# Patient Record
Sex: FEMALE | Race: WHITE | NOT HISPANIC OR LATINO | URBAN - METROPOLITAN AREA
[De-identification: names, ages, dates, MRNs, and addresses within clinical notes are randomized per-mention and may not be internally consistent; named-entity substitution may affect disease eponyms.]

---

## 2019-07-12 PROBLEM — Z00.00 ENCOUNTER FOR PREVENTIVE HEALTH EXAMINATION: Status: ACTIVE | Noted: 2019-07-12

## 2019-08-03 ENCOUNTER — EMERGENCY (EMERGENCY)
Facility: HOSPITAL | Age: 29
LOS: 0 days | Discharge: HOME | End: 2019-08-03
Attending: EMERGENCY MEDICINE | Admitting: EMERGENCY MEDICINE
Payer: COMMERCIAL

## 2019-08-03 VITALS
TEMPERATURE: 98 F | SYSTOLIC BLOOD PRESSURE: 112 MMHG | HEART RATE: 83 BPM | OXYGEN SATURATION: 100 % | RESPIRATION RATE: 19 BRPM | DIASTOLIC BLOOD PRESSURE: 72 MMHG

## 2019-08-03 VITALS
OXYGEN SATURATION: 100 % | HEART RATE: 115 BPM | TEMPERATURE: 99 F | RESPIRATION RATE: 20 BRPM | DIASTOLIC BLOOD PRESSURE: 81 MMHG | SYSTOLIC BLOOD PRESSURE: 125 MMHG

## 2019-08-03 DIAGNOSIS — R07.9 CHEST PAIN, UNSPECIFIED: ICD-10-CM

## 2019-08-03 DIAGNOSIS — R07.89 OTHER CHEST PAIN: ICD-10-CM

## 2019-08-03 LAB
ALBUMIN SERPL ELPH-MCNC: 4.4 G/DL — SIGNIFICANT CHANGE UP (ref 3.5–5.2)
ALP SERPL-CCNC: 41 U/L — SIGNIFICANT CHANGE UP (ref 30–115)
ALT FLD-CCNC: 15 U/L — SIGNIFICANT CHANGE UP (ref 0–41)
ANION GAP SERPL CALC-SCNC: 10 MMOL/L — SIGNIFICANT CHANGE UP (ref 7–14)
AST SERPL-CCNC: 15 U/L — SIGNIFICANT CHANGE UP (ref 0–41)
BILIRUB SERPL-MCNC: 0.5 MG/DL — SIGNIFICANT CHANGE UP (ref 0.2–1.2)
BUN SERPL-MCNC: 8 MG/DL — LOW (ref 10–20)
CALCIUM SERPL-MCNC: 9.5 MG/DL — SIGNIFICANT CHANGE UP (ref 8.5–10.1)
CHLORIDE SERPL-SCNC: 104 MMOL/L — SIGNIFICANT CHANGE UP (ref 98–110)
CO2 SERPL-SCNC: 26 MMOL/L — SIGNIFICANT CHANGE UP (ref 17–32)
CREAT SERPL-MCNC: 0.8 MG/DL — SIGNIFICANT CHANGE UP (ref 0.7–1.5)
D DIMER BLD IA.RAPID-MCNC: 141 NG/ML DDU — SIGNIFICANT CHANGE UP (ref 0–230)
GLUCOSE SERPL-MCNC: 84 MG/DL — SIGNIFICANT CHANGE UP (ref 70–99)
HCT VFR BLD CALC: 38.4 % — SIGNIFICANT CHANGE UP (ref 37–47)
HGB BLD-MCNC: 13 G/DL — SIGNIFICANT CHANGE UP (ref 12–16)
MCHC RBC-ENTMCNC: 30.3 PG — SIGNIFICANT CHANGE UP (ref 27–31)
MCHC RBC-ENTMCNC: 33.9 G/DL — SIGNIFICANT CHANGE UP (ref 32–37)
MCV RBC AUTO: 89.5 FL — SIGNIFICANT CHANGE UP (ref 81–99)
NRBC # BLD: 0 /100 WBCS — SIGNIFICANT CHANGE UP (ref 0–0)
PLATELET # BLD AUTO: 291 K/UL — SIGNIFICANT CHANGE UP (ref 130–400)
POTASSIUM SERPL-MCNC: 4.6 MMOL/L — SIGNIFICANT CHANGE UP (ref 3.5–5)
POTASSIUM SERPL-SCNC: 4.6 MMOL/L — SIGNIFICANT CHANGE UP (ref 3.5–5)
PROT SERPL-MCNC: 7.5 G/DL — SIGNIFICANT CHANGE UP (ref 6–8)
RBC # BLD: 4.29 M/UL — SIGNIFICANT CHANGE UP (ref 4.2–5.4)
RBC # FLD: 12.1 % — SIGNIFICANT CHANGE UP (ref 11.5–14.5)
SODIUM SERPL-SCNC: 140 MMOL/L — SIGNIFICANT CHANGE UP (ref 135–146)
TROPONIN T SERPL-MCNC: <0.01 NG/ML — SIGNIFICANT CHANGE UP
WBC # BLD: 4.87 K/UL — SIGNIFICANT CHANGE UP (ref 4.8–10.8)
WBC # FLD AUTO: 4.87 K/UL — SIGNIFICANT CHANGE UP (ref 4.8–10.8)

## 2019-08-03 PROCEDURE — 93010 ELECTROCARDIOGRAM REPORT: CPT

## 2019-08-03 PROCEDURE — 71046 X-RAY EXAM CHEST 2 VIEWS: CPT | Mod: 26

## 2019-08-03 PROCEDURE — 99285 EMERGENCY DEPT VISIT HI MDM: CPT

## 2019-08-03 RX ORDER — FAMOTIDINE 10 MG/ML
20 INJECTION INTRAVENOUS ONCE
Refills: 0 | Status: COMPLETED | OUTPATIENT
Start: 2019-08-03 | End: 2019-08-03

## 2019-08-03 RX ORDER — LIDOCAINE 4 G/100G
10 CREAM TOPICAL ONCE
Refills: 0 | Status: COMPLETED | OUTPATIENT
Start: 2019-08-03 | End: 2019-08-03

## 2019-08-03 RX ADMIN — LIDOCAINE 10 MILLILITER(S): 4 CREAM TOPICAL at 13:46

## 2019-08-03 RX ADMIN — Medication 30 MILLILITER(S): at 13:44

## 2019-08-03 RX ADMIN — FAMOTIDINE 20 MILLIGRAM(S): 10 INJECTION INTRAVENOUS at 13:46

## 2019-08-03 NOTE — ED ADULT NURSE NOTE - NSIMPLEMENTINTERV_GEN_ALL_ED
Implemented All Universal Safety Interventions:  [place] to call system. Call bell, personal items and telephone within reach. Instruct patient to call for assistance. Room bathroom lighting operational. Non-slip footwear when patient is off stretcher. Physically safe environment: no spills, clutter or unnecessary equipment. Stretcher in lowest position, wheels locked, appropriate side rails in place.

## 2019-08-03 NOTE — ED PROVIDER NOTE - PHYSICAL EXAMINATION
General: Awake, alert, No acute distress  Eyes: PERRL, EOMI, no icterus, lids and conjunctivae are normal  ENT: External inspection normal, pink/moist membranes, pharynx normal, no pharyngeal erythema/exudate  CV: S1S2, regular rate and rhythm, no murmur/gallops/rubs, no JVD, 2+ pulses b/l, no edema, warm/well-perfused  Respiratory: Normal respiratory rate/effort, no respiratory distress, no wheezing/rales/rhonchi, normal voice, speaking full sentences, lungs clear to auscultation b/l, no retractions, no stridor  Abdomen: Soft abdomen, no tender/distended/guarding/rebound, no CVA tenderness  Musculoskeletal: FROM all 4 extremities, N/V intact, pelvis stable, no TLS spinal tender/deform/step-offs, no mike tender/deform, stable gait  Neck: FROM neck, supple, no meningismus, trachea midline, no JVD, no cspine tender/step-offs, no lymphadenopathy  Integumentary: Color normal for race, warm and dry, no rash  Neuro: Oriented x3, CN 2-12 grossly intact, normal motor, normal sensory, normal gait, normal cerebellar  Psych: Oriented x3, mood normal, affect normal

## 2019-08-03 NOTE — ED ADULT TRIAGE NOTE - CHIEF COMPLAINT QUOTE
patient reports left under arm pain radiating through out chest and pain. painful inspiration subjective fever no cough congestion

## 2019-08-03 NOTE — ED PROVIDER NOTE - OBJECTIVE STATEMENT
29F pmhx pcos, gerd p/w left chest burning sensation radiating to the right chest, constant, made worse when laying down. Started 3 days ago. Sensations is 3/10, Pepcid did not help. Pt was recently on birth control that was stopped 3 days ago when pt started to feel these symptoms.

## 2019-08-03 NOTE — ED PROVIDER NOTE - NS ED ROS FT
Constitutional:  No behavior changes, fevers/chills.    Head: No injury, headache, LOC, dizziness/LH.    Eyes:  No visual changes, eye pain, redness, or discharge; no injury; no foreign body sensation.    ENMT:  No ear pain or discharge, hearing problems; no pain or injuries to the nose, ears, mouth, or throat.    Neck:  No pain, stiffness, injury; no loss of range of motion.    Cardiac: Chest burning, palpitations, diaphoresis.    Chest/respiratory: No cough, wheezing, shortness of breath, chest tightness, or trouble breathing; no injuries.    GI: No nausea, vomiting, diarrhea or abdominal pain; no injuries. No changes in PO intake. No melena/brbpr.    :  No dysuria, hematuria, urgency, or injuries. No changes in urine output. No flanl     MS: No pain, weakness, injury, numbness or tingling; no loss of range of motion. No edema. No calf pain/swelling/erythema.    Back:  No pain or injury.    Skin:  No rashes or color changes; no lacerations or abrasions.  Social: No sick contacts, recent travel or rash.

## 2019-08-03 NOTE — ED PROVIDER NOTE - PMH
No pertinent past medical history GERD (gastroesophageal reflux disease)    PCOS (polycystic ovarian syndrome)

## 2019-08-03 NOTE — ED PROVIDER NOTE - CLINICAL SUMMARY MEDICAL DECISION MAKING FREE TEXT BOX
29F pmhx pcos, gerd p/w left chest burning sensation radiating to the right chest, constant, made worse when laying down x 3days. VS wnl. Labs wnl. Trop/Dimer neg. EKG unremarkable. CXR nl. Reeval- pt has resolution of discomfort after GI cocktail. DC home

## 2019-08-03 NOTE — ED ADULT NURSE NOTE - OBJECTIVE STATEMENT
pt presents to ed with burning sensation pt presents to ed with burning sensation in her chest for a cple of days. pt feels it in her left arm and shoulder and her upper back b/l.

## 2019-08-03 NOTE — ED PROVIDER NOTE - NSFOLLOWUPINSTRUCTIONS_ED_ALL_ED_FT
Be sure to take precautions to limit/avoid symptoms. This includes eating small, frequent meals instead of 3 large meals per day. This also includes limiting consumption of: spicy foods, alcohol, fatty foods, chocolate, coffee, peppermint, tomatoes/tomato products. Avoid laying down for at least 3 hours after a meal. Also, you may benefit from using medications to control the acid in your stomach called proton pump inhibitors or H2 blockers, some examples of these that can be purchased over the counter include Pantoprazole, Omeprazole, Ranitidine.

## 2019-08-17 ENCOUNTER — TRANSCRIPTION ENCOUNTER (OUTPATIENT)
Age: 29
End: 2019-08-17

## 2019-09-26 ENCOUNTER — APPOINTMENT (OUTPATIENT)
Dept: OTOLARYNGOLOGY | Facility: CLINIC | Age: 29
End: 2019-09-26

## 2021-03-19 PROBLEM — E28.2 POLYCYSTIC OVARIAN SYNDROME: Chronic | Status: ACTIVE | Noted: 2019-08-03

## 2021-03-19 PROBLEM — K21.9 GASTRO-ESOPHAGEAL REFLUX DISEASE WITHOUT ESOPHAGITIS: Chronic | Status: ACTIVE | Noted: 2019-08-03

## 2021-04-27 ENCOUNTER — TRANSCRIPTION ENCOUNTER (OUTPATIENT)
Age: 31
End: 2021-04-27

## 2021-07-12 ENCOUNTER — APPOINTMENT (OUTPATIENT)
Dept: OBGYN | Facility: CLINIC | Age: 31
End: 2021-07-12

## 2021-08-09 ENCOUNTER — LABORATORY RESULT (OUTPATIENT)
Age: 31
End: 2021-08-09

## 2021-08-09 ENCOUNTER — NON-APPOINTMENT (OUTPATIENT)
Age: 31
End: 2021-08-09

## 2021-08-09 ENCOUNTER — APPOINTMENT (OUTPATIENT)
Dept: OBGYN | Facility: CLINIC | Age: 31
End: 2021-08-09
Payer: COMMERCIAL

## 2021-08-09 VITALS
WEIGHT: 167 LBS | HEART RATE: 114 BPM | DIASTOLIC BLOOD PRESSURE: 81 MMHG | SYSTOLIC BLOOD PRESSURE: 116 MMHG | TEMPERATURE: 98 F | HEIGHT: 69 IN | BODY MASS INDEX: 24.73 KG/M2

## 2021-08-09 DIAGNOSIS — Z80.0 FAMILY HISTORY OF MALIGNANT NEOPLASM OF DIGESTIVE ORGANS: ICD-10-CM

## 2021-08-09 DIAGNOSIS — K44.9 DIAPHRAGMATIC HERNIA W/OUT OBSTRUCTION OR GANGRENE: ICD-10-CM

## 2021-08-09 DIAGNOSIS — K21.9 GASTRO-ESOPHAGEAL REFLUX DISEASE W/OUT ESOPHAGITIS: ICD-10-CM

## 2021-08-09 PROCEDURE — 99213 OFFICE O/P EST LOW 20 MIN: CPT | Mod: 25

## 2021-08-09 PROCEDURE — 76830 TRANSVAGINAL US NON-OB: CPT

## 2021-08-09 PROCEDURE — 81003 URINALYSIS AUTO W/O SCOPE: CPT | Mod: QW

## 2021-08-09 NOTE — PLAN
[FreeTextEntry1] : Urine dip notable for leukocyte Estrace, will treat presumptively for urinary tract infection, follow-up urinary and vaginal cultures and treat otherwise accordingly.\par We will start topical ointment for external vulvar irritation.\par Transvaginal ultrasound done in office notable for small left ovarian simple cyst, otherwise unremarkable, will continue to monitor.  At this point she does not desire to restart a hormonal method for ovarian cyst suppression and menstrual regulation.

## 2021-08-09 NOTE — REVIEW OF SYSTEMS
[Negative] : Heme/Lymph [Dysuria] : dysuria [Abn Vaginal bleeding] : abnormal vaginal bleeding [Genital Rash/Irritation] : genital rash/irritation

## 2021-08-09 NOTE — PHYSICAL EXAM
[Appropriately responsive] : appropriately responsive [Alert] : alert [No Acute Distress] : no acute distress [Oriented x3] : oriented x3 [Labia Majora] : normal [Labia Minora] : normal [Normal] : normal [Uterine Adnexae] : normal [FreeTextEntry5] : Unlabored

## 2021-08-09 NOTE — HISTORY OF PRESENT ILLNESS
[FreeTextEntry1] : 31-year-old female here for follow-up GYN visit.  She has been having increased vulvovaginal irritation with burning and pelvic pressure for the last 2 days.  She sexual active with 1 partner for the past 8 years, denies any correlation with intercourse.  Her menstrual cycles are regular in frequency, heavy bleeding with dysmenorrhea.  She is being followed by endocrinologist to start her on Adipex to decrease her weight and also to decrease her elevated testosterone level.

## 2021-08-09 NOTE — PROCEDURE
[Pelvic Pain] : pelvic pain [Suspected Ovarian Cyst] : suspected ovarian cyst [Transvaginal Ultrasound] : transvaginal ultrasound [L: ___ cm] : L: [unfilled] cm [W: ___cm] : W: [unfilled] cm [H: ___ cm] : H: [unfilled] cm [FreeTextEntry9] : Menorrhagia [FreeTextEntry5] : 57 cc, 9.3 mm EMS [FreeTextEntry7] : 3.3 x 2.5 x 2.3 cm, normal-appearing [FreeTextEntry8] : 3.7 x 3.5 x 3.3 cm, small simple cyst

## 2021-08-13 LAB
A VAGINAE DNA VAG QL NAA+PROBE: NORMAL
APPEARANCE: ABNORMAL
BACTERIA UR CULT: NORMAL
BILIRUBIN URINE: NEGATIVE
BLOOD URINE: NEGATIVE
BVAB2 DNA VAG QL NAA+PROBE: NORMAL
C KRUSEI DNA VAG QL NAA+PROBE: NEGATIVE
C TRACH RRNA SPEC QL NAA+PROBE: NEGATIVE
COLOR: YELLOW
GLUCOSE QUALITATIVE U: NEGATIVE
KETONES URINE: NEGATIVE
LEUKOCYTE ESTERASE URINE: NEGATIVE
MEGA1 DNA VAG QL NAA+PROBE: NORMAL
N GONORRHOEA RRNA SPEC QL NAA+PROBE: NEGATIVE
NITRITE URINE: NEGATIVE
PH URINE: 7
PROTEIN URINE: NORMAL
SPECIFIC GRAVITY URINE: 1.02
T VAGINALIS RRNA SPEC QL NAA+PROBE: NEGATIVE
UROBILINOGEN URINE: NORMAL

## 2021-08-18 LAB
MYCOPLASMA HOMINIS CULTURE: NEGATIVE
UREAPLASMA CULTURE: NEGATIVE

## 2021-11-02 ENCOUNTER — TRANSCRIPTION ENCOUNTER (OUTPATIENT)
Age: 31
End: 2021-11-02

## 2022-11-27 ENCOUNTER — EMERGENCY (EMERGENCY)
Facility: HOSPITAL | Age: 32
LOS: 0 days | Discharge: HOME | End: 2022-11-27
Attending: EMERGENCY MEDICINE | Admitting: EMERGENCY MEDICINE

## 2022-11-27 VITALS
WEIGHT: 177.91 LBS | HEART RATE: 125 BPM | OXYGEN SATURATION: 98 % | RESPIRATION RATE: 16 BRPM | DIASTOLIC BLOOD PRESSURE: 90 MMHG | TEMPERATURE: 98 F | SYSTOLIC BLOOD PRESSURE: 132 MMHG

## 2022-11-27 DIAGNOSIS — R09.81 NASAL CONGESTION: ICD-10-CM

## 2022-11-27 DIAGNOSIS — Z87.19 PERSONAL HISTORY OF OTHER DISEASES OF THE DIGESTIVE SYSTEM: ICD-10-CM

## 2022-11-27 DIAGNOSIS — E28.2 POLYCYSTIC OVARIAN SYNDROME: ICD-10-CM

## 2022-11-27 DIAGNOSIS — H92.03 OTALGIA, BILATERAL: ICD-10-CM

## 2022-11-27 DIAGNOSIS — J02.9 ACUTE PHARYNGITIS, UNSPECIFIED: ICD-10-CM

## 2022-11-27 DIAGNOSIS — H10.9 UNSPECIFIED CONJUNCTIVITIS: ICD-10-CM

## 2022-11-27 PROCEDURE — 99283 EMERGENCY DEPT VISIT LOW MDM: CPT

## 2022-11-27 RX ORDER — POLYMYXIN B SULF/TRIMETHOPRIM 10000-1/ML
2 DROPS OPHTHALMIC (EYE)
Qty: 15 | Refills: 0
Start: 2022-11-27 | End: 2022-12-03

## 2022-11-27 NOTE — ED PROVIDER NOTE - PATIENT PORTAL LINK FT
You can access the FollowMyHealth Patient Portal offered by [facility] by registering at the following website: [email or web address]. By joining [facility]’s FollowMyHealth portal, you will also be able to view your health information using other applications (apps) compatible with our system.

## 2022-11-27 NOTE — ED PROVIDER NOTE - CLINICAL SUMMARY MEDICAL DECISION MAKING FREE TEXT BOX
33 yo woman w/ conjunctivitis. Symptoms including pharyngisitis, congestion, ear fullness are all likely secondary to viral etiology. However, w/ unilateral conjunctivitis, will tx w/ topical abx and recommend close f/u w/ PCP.

## 2022-11-27 NOTE — ED PROVIDER NOTE - OBJECTIVE STATEMENT
31 yo woman w/ hx of GERD here w/ 1 day of R eye discharge and pain. no change in vision. no FB sensation.   States 3 weeks prior, was dx w/ sinusitis, b/l OM and pharyngitis. Treated w/ cefdinir and improved. About 3-4 days ago, started w/ sore throat again and b/l ear pain. Seen at  and told ears and throat looked okay. Eye pain and discharge started today prompting visit. had temp to 102 at home 3 days prior but no temp today. Did not take any meds for pain.  States throat pain is 3-4/10, burning but no difficulty swallowing or eating.  Pressure in b/l ears 4-5/10  no sinus pressure but + congestion  no headache, no neck pain  no cp, sob, bazzi, palpitations

## 2022-11-27 NOTE — ED PROVIDER NOTE - NSICDXPASTMEDICALHX_GEN_ALL_CORE_FT
PAST MEDICAL HISTORY:  GERD (gastroesophageal reflux disease)     PCOS (polycystic ovarian syndrome)

## 2022-11-27 NOTE — ED PROVIDER NOTE - PHYSICAL EXAMINATION
wd/wn  nad  non-toxic  eomi, perrl  R eye w/ crusty yellow d/c  vision 20/20 w/ glasses (no contacts)  TM wnl b/l  OP: no erythema, uvula midline, no tonsilar swelling. no trismus  no anterior neck ttp  cta b/l  rrr s1s2 wnl  hr = 92 on my exam  aao X 3

## 2022-12-10 RX ORDER — CLOTRIMAZOLE AND BETAMETHASONE DIPROPIONATE 10; .5 MG/G; MG/G
1-0.05 CREAM TOPICAL TWICE DAILY
Qty: 1 | Refills: 1 | Status: COMPLETED | COMMUNITY
Start: 2021-08-09 | End: 2022-12-10

## 2022-12-10 RX ORDER — CIPROFLOXACIN HYDROCHLORIDE 250 MG/1
250 TABLET, FILM COATED ORAL
Qty: 6 | Refills: 0 | Status: COMPLETED | COMMUNITY
Start: 2021-08-09 | End: 2022-12-10

## 2022-12-12 ENCOUNTER — APPOINTMENT (OUTPATIENT)
Dept: OBGYN | Facility: CLINIC | Age: 32
End: 2022-12-12

## 2022-12-12 VITALS — DIASTOLIC BLOOD PRESSURE: 70 MMHG | SYSTOLIC BLOOD PRESSURE: 110 MMHG

## 2022-12-12 VITALS — TEMPERATURE: 97.9 F | HEIGHT: 69 IN | WEIGHT: 180 LBS | BODY MASS INDEX: 26.66 KG/M2

## 2022-12-12 DIAGNOSIS — Z87.42 PERSONAL HISTORY OF OTHER DISEASES OF THE FEMALE GENITAL TRACT: ICD-10-CM

## 2022-12-12 DIAGNOSIS — Z87.898 PERSONAL HISTORY OF OTHER SPECIFIED CONDITIONS: ICD-10-CM

## 2022-12-12 DIAGNOSIS — N83.202 UNSPECIFIED OVARIAN CYST, LEFT SIDE: ICD-10-CM

## 2022-12-12 LAB
BILIRUB UR QL STRIP: NORMAL
CLARITY UR: CLEAR
COLLECTION METHOD: NORMAL
GLUCOSE UR-MCNC: NORMAL
HCG UR QL: 0.2 EU/DL
HGB UR QL STRIP.AUTO: NORMAL
KETONES UR-MCNC: NORMAL
LEUKOCYTE ESTERASE UR QL STRIP: NORMAL
NITRITE UR QL STRIP: NORMAL
PH UR STRIP: 5.5
PROT UR STRIP-MCNC: NORMAL
SP GR UR STRIP: 1.03

## 2022-12-12 PROCEDURE — 76830 TRANSVAGINAL US NON-OB: CPT

## 2022-12-12 PROCEDURE — 81002 URINALYSIS NONAUTO W/O SCOPE: CPT

## 2022-12-12 PROCEDURE — 99212 OFFICE O/P EST SF 10 MIN: CPT | Mod: 25

## 2022-12-12 NOTE — HISTORY OF PRESENT ILLNESS
[FreeTextEntry1] : 33 yo G0 LMP 11/2/22 (approximate), presents for positive pregnancy test at home. She reports some food aversions and worsening GERD, but is eating well. She denies bleeding or cramping. SHe has some mild breast tenderness. This is a planned and desired pregnancy.\par \par She has h/o menorrhagia with regular cycles for the last few years, is not completely sure of LMP because she did not track it.\par \par Labs in quest 1 week ago: , progesterone 25\par \par OB: Nulligravida\par GYN: Denies h/o fibroids, abnormal paps, or STIs. H/o cysts treated conservatively. Last GYN visit over 1 year ago. 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

## 2022-12-12 NOTE — DISCUSSION/SUMMARY
[FreeTextEntry1] : 31 yo G0 LMP 11/2/22 (approximate) with secondary amenorrhea\par - By LMP approx 5-6 weeks (uncertain of date)\par - Ultrasound consistent with dates most likely, discussed will need CRL to confirm\par - Counseled on general pregnancy safety: avoiding alcohol, avoiding unpasteurized dairy, cooking meat well, limiting large fish and deli meats, avoiding cat litter, limiting caffeine\par - Discussed miscarriage risk greatest until 10 weeks, precautions discussed, call with any change in status\par - Pap and HPV collected for routine screening\par - Return 2-3 weeks for confirmation of viability

## 2022-12-14 LAB
CYTOLOGY CVX/VAG DOC THIN PREP: NORMAL
HPV HIGH+LOW RISK DNA PNL CVX: NOT DETECTED

## 2022-12-19 ENCOUNTER — NON-APPOINTMENT (OUTPATIENT)
Age: 32
End: 2022-12-19

## 2022-12-30 ENCOUNTER — APPOINTMENT (OUTPATIENT)
Dept: OBGYN | Facility: CLINIC | Age: 32
End: 2022-12-30
Payer: COMMERCIAL

## 2022-12-30 VITALS — BODY MASS INDEX: 26.51 KG/M2 | WEIGHT: 179 LBS | TEMPERATURE: 98 F | HEIGHT: 69 IN

## 2022-12-30 VITALS — DIASTOLIC BLOOD PRESSURE: 72 MMHG | SYSTOLIC BLOOD PRESSURE: 118 MMHG

## 2022-12-30 LAB
BILIRUB UR QL STRIP: NORMAL
GLUCOSE UR-MCNC: NORMAL
HCG UR QL: 0.2 EU/DL
HGB UR QL STRIP.AUTO: NORMAL
KETONES UR-MCNC: NORMAL
LEUKOCYTE ESTERASE UR QL STRIP: NORMAL
NITRITE UR QL STRIP: NORMAL
PH UR STRIP: 6
PROT UR STRIP-MCNC: NORMAL
SP GR UR STRIP: 1.02

## 2022-12-30 PROCEDURE — 99213 OFFICE O/P EST LOW 20 MIN: CPT

## 2022-12-30 PROCEDURE — 76817 TRANSVAGINAL US OBSTETRIC: CPT

## 2022-12-30 NOTE — PHYSICAL EXAM
[Appropriately responsive] : appropriately responsive [Alert] : alert [No Acute Distress] : no acute distress [Soft] : soft [Non-tender] : non-tender [Non-distended] : non-distended [No Lesions] : no lesions [No Mass] : no mass [Oriented x3] : oriented x3 [Labia Majora] : normal [Labia Minora] : normal [No Bleeding] : There was no active vaginal bleeding [Normal] : normal [Anteversion] : anteverted [Uterine Adnexae] : normal [Tenderness] : nontender [Enlarged ___ wks] : not enlarged

## 2022-12-30 NOTE — DISCUSSION/SUMMARY
[FreeTextEntry1] : 33 yo G0 LMP 11/2/22 (approximate),with secondary amenorrhea\par - LMP uncertain\par - By sono today 7w5d with EDC 8/13/23\par - Counseled on general pregnancy safety: avoiding alcohol, avoiding unpasteurized dairy, cooking meat well, limiting large fish and deli meats, avoiding cat litter, limiting caffeine\par - DIscussed highest risk of miscarriage before 10 weeks, precautions discussed.\par - Prenatal lab requisition given, to be done after 10 weeks.\par - Return 3 weeks for prenatal visit.

## 2022-12-30 NOTE — HISTORY OF PRESENT ILLNESS
[FreeTextEntry1] : 33 yo G0 LMP 11/2/22 (approximate), presents for confirmation of pregnancy. She has irregular cramping, denies bleeding. She has been eating well with some food aversions.\par \par Last visit had intrauterine yolk sac without fetal pole on TVUS.\par \par OB: Nulligravida\par GYN: Denies h/o fibroids, abnormal paps, or STIs. H/o cysts treated conservatively. Last GYN visit over 1 year ago. 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

## 2023-01-19 ENCOUNTER — APPOINTMENT (OUTPATIENT)
Dept: OBGYN | Facility: CLINIC | Age: 33
End: 2023-01-19
Payer: COMMERCIAL

## 2023-01-19 VITALS — BODY MASS INDEX: 26.66 KG/M2 | HEIGHT: 69 IN | TEMPERATURE: 98 F | WEIGHT: 180 LBS

## 2023-01-19 PROCEDURE — 0500F INITIAL PRENATAL CARE VISIT: CPT

## 2023-01-30 ENCOUNTER — NON-APPOINTMENT (OUTPATIENT)
Age: 33
End: 2023-01-30

## 2023-01-30 DIAGNOSIS — N91.1 SECONDARY AMENORRHEA: ICD-10-CM

## 2023-01-30 DIAGNOSIS — Z87.42 PERSONAL HISTORY OF OTHER DISEASES OF THE FEMALE GENITAL TRACT: ICD-10-CM

## 2023-02-06 ENCOUNTER — NON-APPOINTMENT (OUTPATIENT)
Age: 33
End: 2023-02-06

## 2023-02-08 ENCOUNTER — APPOINTMENT (OUTPATIENT)
Dept: OBGYN | Facility: CLINIC | Age: 33
End: 2023-02-08
Payer: COMMERCIAL

## 2023-02-08 VITALS — HEIGHT: 68 IN | WEIGHT: 185 LBS | TEMPERATURE: 98.2 F | BODY MASS INDEX: 28.04 KG/M2

## 2023-02-08 LAB
BILIRUB UR QL STRIP: NORMAL
GLUCOSE UR-MCNC: NORMAL
HCG UR QL: 0.2 EU/DL
HGB UR QL STRIP.AUTO: NORMAL
KETONES UR-MCNC: NORMAL
LEUKOCYTE ESTERASE UR QL STRIP: NORMAL
NITRITE UR QL STRIP: NORMAL
PH UR STRIP: 5
PROT UR STRIP-MCNC: NORMAL
SP GR UR STRIP: 1

## 2023-02-08 PROCEDURE — 0502F SUBSEQUENT PRENATAL CARE: CPT

## 2023-02-08 PROCEDURE — 76813 OB US NUCHAL MEAS 1 GEST: CPT

## 2023-02-21 ENCOUNTER — NON-APPOINTMENT (OUTPATIENT)
Age: 33
End: 2023-02-21

## 2023-03-01 ENCOUNTER — APPOINTMENT (OUTPATIENT)
Dept: OBGYN | Facility: CLINIC | Age: 33
End: 2023-03-01
Payer: COMMERCIAL

## 2023-03-01 VITALS — TEMPERATURE: 97.9 F | HEIGHT: 68 IN | WEIGHT: 190 LBS | BODY MASS INDEX: 28.79 KG/M2

## 2023-03-01 PROCEDURE — 0502F SUBSEQUENT PRENATAL CARE: CPT

## 2023-03-14 ENCOUNTER — LABORATORY RESULT (OUTPATIENT)
Age: 33
End: 2023-03-14

## 2023-03-23 ENCOUNTER — APPOINTMENT (OUTPATIENT)
Dept: OBGYN | Facility: CLINIC | Age: 33
End: 2023-03-23
Payer: COMMERCIAL

## 2023-03-23 VITALS — WEIGHT: 196 LBS | HEIGHT: 68 IN | BODY MASS INDEX: 29.7 KG/M2

## 2023-03-23 LAB
BILIRUB UR QL STRIP: NORMAL
GLUCOSE UR-MCNC: NORMAL
HCG UR QL: 0.2 EU/DL
HGB UR QL STRIP.AUTO: NORMAL
KETONES UR-MCNC: NORMAL
LEUKOCYTE ESTERASE UR QL STRIP: NORMAL
NITRITE UR QL STRIP: NORMAL
PH UR STRIP: 5.5
PROT UR STRIP-MCNC: NORMAL
SP GR UR STRIP: 1.03

## 2023-03-23 PROCEDURE — 0502F SUBSEQUENT PRENATAL CARE: CPT

## 2023-04-04 ENCOUNTER — APPOINTMENT (OUTPATIENT)
Dept: OBGYN | Facility: CLINIC | Age: 33
End: 2023-04-04
Payer: COMMERCIAL

## 2023-04-04 PROCEDURE — 76816 OB US FOLLOW-UP PER FETUS: CPT

## 2023-04-15 ENCOUNTER — NON-APPOINTMENT (OUTPATIENT)
Age: 33
End: 2023-04-15

## 2023-04-19 ENCOUNTER — APPOINTMENT (OUTPATIENT)
Dept: OBGYN | Facility: CLINIC | Age: 33
End: 2023-04-19
Payer: COMMERCIAL

## 2023-04-19 VITALS — WEIGHT: 200 LBS | HEIGHT: 68 IN | BODY MASS INDEX: 30.31 KG/M2

## 2023-04-19 LAB
BILIRUB UR QL STRIP: NORMAL
GLUCOSE UR-MCNC: NORMAL
HCG UR QL: 0.2 EU/DL
HGB UR QL STRIP.AUTO: NORMAL
KETONES UR-MCNC: NORMAL
LEUKOCYTE ESTERASE UR QL STRIP: NORMAL
NITRITE UR QL STRIP: NORMAL
PH UR STRIP: 7
PROT UR STRIP-MCNC: NORMAL
SP GR UR STRIP: 1.03

## 2023-04-19 PROCEDURE — 0502F SUBSEQUENT PRENATAL CARE: CPT

## 2023-05-04 ENCOUNTER — APPOINTMENT (OUTPATIENT)
Dept: OBGYN | Facility: CLINIC | Age: 33
End: 2023-05-04
Payer: COMMERCIAL

## 2023-05-04 VITALS — BODY MASS INDEX: 31.37 KG/M2 | HEIGHT: 68 IN | WEIGHT: 207 LBS

## 2023-05-04 DIAGNOSIS — Z87.09 PERSONAL HISTORY OF OTHER DISEASES OF THE RESPIRATORY SYSTEM: ICD-10-CM

## 2023-05-04 PROCEDURE — 0502F SUBSEQUENT PRENATAL CARE: CPT

## 2023-05-04 RX ORDER — PHENTERMINE HYDROCHLORIDE 37.5 MG/1
CAPSULE ORAL
Refills: 0 | Status: COMPLETED | COMMUNITY
End: 2023-05-04

## 2023-05-04 RX ORDER — PREDNISONE 5 MG/1
5 TABLET ORAL
Qty: 1 | Refills: 0 | Status: COMPLETED | COMMUNITY
Start: 2022-12-16 | End: 2023-05-04

## 2023-05-08 ENCOUNTER — NON-APPOINTMENT (OUTPATIENT)
Age: 33
End: 2023-05-08

## 2023-05-10 ENCOUNTER — APPOINTMENT (OUTPATIENT)
Dept: OBGYN | Facility: CLINIC | Age: 33
End: 2023-05-10
Payer: COMMERCIAL

## 2023-05-10 VITALS — BODY MASS INDEX: 31.67 KG/M2 | WEIGHT: 209 LBS | TEMPERATURE: 97.4 F | HEIGHT: 68 IN

## 2023-05-10 PROCEDURE — 0502F SUBSEQUENT PRENATAL CARE: CPT

## 2023-05-30 ENCOUNTER — NON-APPOINTMENT (OUTPATIENT)
Age: 33
End: 2023-05-30

## 2023-06-01 ENCOUNTER — APPOINTMENT (OUTPATIENT)
Dept: OBGYN | Facility: CLINIC | Age: 33
End: 2023-06-01
Payer: COMMERCIAL

## 2023-06-01 VITALS — HEIGHT: 69 IN | WEIGHT: 220 LBS | BODY MASS INDEX: 32.58 KG/M2

## 2023-06-01 LAB
BILIRUB UR QL STRIP: NEGATIVE
GLUCOSE UR-MCNC: 100
HCG UR QL: 0.2 EU/DL
HGB UR QL STRIP.AUTO: NORMAL
KETONES UR-MCNC: NEGATIVE
LEUKOCYTE ESTERASE UR QL STRIP: NEGATIVE
NITRITE UR QL STRIP: NEGATIVE
PH UR STRIP: 5.5
PROT UR STRIP-MCNC: NEGATIVE
SP GR UR STRIP: 1.03

## 2023-06-01 PROCEDURE — 90471 IMMUNIZATION ADMIN: CPT

## 2023-06-01 PROCEDURE — 0502F SUBSEQUENT PRENATAL CARE: CPT

## 2023-06-01 PROCEDURE — 90715 TDAP VACCINE 7 YRS/> IM: CPT

## 2023-06-10 ENCOUNTER — NON-APPOINTMENT (OUTPATIENT)
Age: 33
End: 2023-06-10

## 2023-06-14 ENCOUNTER — NON-APPOINTMENT (OUTPATIENT)
Age: 33
End: 2023-06-14

## 2023-06-14 ENCOUNTER — APPOINTMENT (OUTPATIENT)
Dept: OBGYN | Facility: CLINIC | Age: 33
End: 2023-06-14
Payer: COMMERCIAL

## 2023-06-14 VITALS — WEIGHT: 223 LBS | HEIGHT: 69 IN | BODY MASS INDEX: 33.03 KG/M2 | TEMPERATURE: 97.9 F

## 2023-06-14 LAB
BILIRUB UR QL STRIP: NORMAL
GLUCOSE UR-MCNC: NORMAL
HCG UR QL: 0.2 EU/DL
HGB UR QL STRIP.AUTO: NORMAL
KETONES UR-MCNC: NORMAL
LEUKOCYTE ESTERASE UR QL STRIP: NORMAL
NITRITE UR QL STRIP: NORMAL
PH UR STRIP: 6
PROT UR STRIP-MCNC: NORMAL
SP GR UR STRIP: 1.03

## 2023-06-14 PROCEDURE — 76819 FETAL BIOPHYS PROFIL W/O NST: CPT

## 2023-06-14 PROCEDURE — 76820 UMBILICAL ARTERY ECHO: CPT

## 2023-06-14 PROCEDURE — 0502F SUBSEQUENT PRENATAL CARE: CPT

## 2023-06-14 PROCEDURE — 76815 OB US LIMITED FETUS(S): CPT

## 2023-06-24 ENCOUNTER — NON-APPOINTMENT (OUTPATIENT)
Age: 33
End: 2023-06-24

## 2023-06-26 ENCOUNTER — APPOINTMENT (OUTPATIENT)
Dept: OBGYN | Facility: CLINIC | Age: 33
End: 2023-06-26
Payer: COMMERCIAL

## 2023-06-26 VITALS — BODY MASS INDEX: 33.62 KG/M2 | HEIGHT: 69 IN | WEIGHT: 227 LBS

## 2023-06-26 LAB
BILIRUB UR QL STRIP: NEGATIVE
CLARITY UR: CLEAR
GLUCOSE UR-MCNC: NEGATIVE
HCG UR QL: 0.2 EU/DL
HGB UR QL STRIP.AUTO: NEGATIVE
KETONES UR-MCNC: NEGATIVE
LEUKOCYTE ESTERASE UR QL STRIP: NORMAL
NITRITE UR QL STRIP: NEGATIVE
PH UR STRIP: 6
PROT UR STRIP-MCNC: NEGATIVE
SP GR UR STRIP: 1.03

## 2023-06-26 PROCEDURE — 0502F SUBSEQUENT PRENATAL CARE: CPT

## 2023-06-26 RX ORDER — NITROFURANTOIN (MONOHYDRATE/MACROCRYSTALS) 25; 75 MG/1; MG/1
100 CAPSULE ORAL
Qty: 14 | Refills: 0 | Status: COMPLETED | COMMUNITY
Start: 2023-05-04 | End: 2023-06-26

## 2023-07-11 ENCOUNTER — NON-APPOINTMENT (OUTPATIENT)
Age: 33
End: 2023-07-11

## 2023-07-13 ENCOUNTER — APPOINTMENT (OUTPATIENT)
Dept: OBGYN | Facility: CLINIC | Age: 33
End: 2023-07-13
Payer: COMMERCIAL

## 2023-07-13 VITALS — BODY MASS INDEX: 34.07 KG/M2 | WEIGHT: 230 LBS | HEIGHT: 69 IN

## 2023-07-13 PROCEDURE — 0502F SUBSEQUENT PRENATAL CARE: CPT

## 2023-07-16 ENCOUNTER — OUTPATIENT (OUTPATIENT)
Dept: INPATIENT UNIT | Facility: HOSPITAL | Age: 33
LOS: 1 days | Discharge: HOME | End: 2023-07-16
Payer: COMMERCIAL

## 2023-07-16 ENCOUNTER — NON-APPOINTMENT (OUTPATIENT)
Age: 33
End: 2023-07-16

## 2023-07-16 VITALS — HEART RATE: 81 BPM | SYSTOLIC BLOOD PRESSURE: 121 MMHG | DIASTOLIC BLOOD PRESSURE: 70 MMHG

## 2023-07-16 VITALS — HEART RATE: 98 BPM | SYSTOLIC BLOOD PRESSURE: 140 MMHG | DIASTOLIC BLOOD PRESSURE: 77 MMHG

## 2023-07-16 DIAGNOSIS — R10.9 UNSPECIFIED ABDOMINAL PAIN: ICD-10-CM

## 2023-07-16 DIAGNOSIS — O26.899 OTHER SPECIFIED PREGNANCY RELATED CONDITIONS, UNSPECIFIED TRIMESTER: ICD-10-CM

## 2023-07-16 DIAGNOSIS — Z3A.00 WEEKS OF GESTATION OF PREGNANCY NOT SPECIFIED: ICD-10-CM

## 2023-07-16 PROCEDURE — 99214 OFFICE O/P EST MOD 30 MIN: CPT

## 2023-07-16 PROCEDURE — 59050 FETAL MONITOR W/REPORT: CPT

## 2023-07-16 NOTE — OB PROVIDER TRIAGE NOTE - NSOBPROVIDERNOTE_OBGYN_ALL_OB_FT
34yo  at 36w0d, LISBETH 23, dated by first trimester sonogram, not in  labor, reassuring maternal and fetal status BPP     - encouraged PO hydration  - PTL precautions  - f/u with PMD at next appt     aware

## 2023-07-16 NOTE — OB PROVIDER TRIAGE NOTE - HISTORY OF PRESENT ILLNESS
34yo  at 36w0d, LISBETH 23, dated by first trimester sonogram, presented for cramping that started today at approx 4pm, felt irregularly, rated 7/10 in intensity. Reports she found out her mother  this AM from cardiac arrest and she has been crying and not hydrating throughout the day. Denies vaginal bleeding or leakage of fluid. Reports good fetal movement. Denies complications in pregnancy. Denies fever, chills, nausea, vomiting. Denies dysuria, hematuria, increased urgency or frequency. Had one elevated BP when first BP taken in triage. Denies history of elevated BPs. Denies headache, vision change, SOB, nausea, vomiting, RUQ/epigastric pain, or new onset/worsening swelling. Last meal was half hour prior to presentation (pretzels), last BM was yesterday (denies diarrhea or constipation), last sexual intercourse was 2 weeks ago. GBS positive.

## 2023-07-16 NOTE — OB PROVIDER TRIAGE NOTE - NSHPLABSRESULTS_GEN_ALL_CORE
Type and Screen: A pos  Antibody screen: neg   Rubella: immune  measles: nonimmune  RPR: neg  HbSAg: neg  HIV: NR    Third Trimester:  HIV: NR  GBS: pos    Sonograms:   2/28 EGA 13w6d, normal NT  4/4 EGA 21w2d, EFW 409g, normal anatomy  6/14 EGA 32w2d, EFW 1934g (51%), MVP 5.65cm, BPP 8/8, cephalic, ant placenta

## 2023-07-16 NOTE — OB PROVIDER TRIAGE NOTE - NSHPPHYSICALEXAM_GEN_ALL_CORE
T(C): 36.7 (07-16-23 @ 18:55), Max: 36.7 (07-16-23 @ 18:55)  T(F): 98.1 (07-16-23 @ 18:55), Max: 98.1 (07-16-23 @ 18:55)  HR: 86 (07-16-23 @ 19:24) (86 - 100)  BP: 118/68 (07-16-23 @ 19:24) (118/68 - 140/77)  RR: 18 (07-16-23 @ 18:55) (18 - 18)    Gen: AOx3, NAD  CV: normal s1,s2  Pulm: CTAB  Abd: soft, gravid, nontender, (palpable contractions)  Ext: no swelling  Bedside sono: cephalic, anterior placenta, MVP 3.02cm, BPP 8/8     EFM: 145/mod dotty/+accels   [place]: uterine irritability   SVE: 0/0/-3/vtx/intact

## 2023-07-16 NOTE — OB RN TRIAGE NOTE - FALL HARM RISK - UNIVERSAL INTERVENTIONS
Bed in lowest position, wheels locked, appropriate side rails in place/Call bell, personal items and telephone in reach/Instruct patient to call for assistance before getting out of bed or chair/Non-slip footwear when patient is out of bed/[place] to call system/Physically safe environment - no spills, clutter or unnecessary equipment/Purposeful Proactive Rounding/Room/bathroom lighting operational, light cord in reach

## 2023-07-17 ENCOUNTER — NON-APPOINTMENT (OUTPATIENT)
Age: 33
End: 2023-07-17

## 2023-07-17 LAB
GP B STREP DNA SPEC QL NAA+PROBE: DETECTED
SOURCE GBS: NORMAL

## 2023-07-19 ENCOUNTER — APPOINTMENT (OUTPATIENT)
Dept: OBGYN | Facility: CLINIC | Age: 33
End: 2023-07-19
Payer: COMMERCIAL

## 2023-07-19 VITALS — HEIGHT: 69 IN | BODY MASS INDEX: 34.07 KG/M2 | TEMPERATURE: 97.6 F | WEIGHT: 230 LBS

## 2023-07-19 PROCEDURE — 0502F SUBSEQUENT PRENATAL CARE: CPT

## 2023-07-22 ENCOUNTER — NON-APPOINTMENT (OUTPATIENT)
Age: 33
End: 2023-07-22

## 2023-07-23 ENCOUNTER — NON-APPOINTMENT (OUTPATIENT)
Age: 33
End: 2023-07-23

## 2023-07-26 ENCOUNTER — APPOINTMENT (OUTPATIENT)
Dept: OBGYN | Facility: CLINIC | Age: 33
End: 2023-07-26
Payer: COMMERCIAL

## 2023-07-26 VITALS — BODY MASS INDEX: 34.8 KG/M2 | HEIGHT: 69 IN | WEIGHT: 235 LBS

## 2023-07-26 PROCEDURE — 0502F SUBSEQUENT PRENATAL CARE: CPT

## 2023-08-03 ENCOUNTER — APPOINTMENT (OUTPATIENT)
Dept: OBGYN | Facility: CLINIC | Age: 33
End: 2023-08-03
Payer: COMMERCIAL

## 2023-08-03 VITALS — WEIGHT: 235 LBS | HEIGHT: 69 IN | TEMPERATURE: 98.2 F | BODY MASS INDEX: 34.8 KG/M2

## 2023-08-03 LAB
BILIRUB UR QL STRIP: NORMAL
GLUCOSE UR-MCNC: NORMAL
HCG UR QL: 0.2 EU/DL
HGB UR QL STRIP.AUTO: NORMAL
KETONES UR-MCNC: NORMAL
LEUKOCYTE ESTERASE UR QL STRIP: NORMAL
NITRITE UR QL STRIP: NORMAL
PH UR STRIP: 7
PROT UR STRIP-MCNC: NORMAL
SP GR UR STRIP: 1

## 2023-08-03 PROCEDURE — 0502F SUBSEQUENT PRENATAL CARE: CPT

## 2023-08-09 ENCOUNTER — INPATIENT (INPATIENT)
Facility: HOSPITAL | Age: 33
LOS: 4 days | Discharge: HOME | End: 2023-08-14
Attending: STUDENT IN AN ORGANIZED HEALTH CARE EDUCATION/TRAINING PROGRAM | Admitting: STUDENT IN AN ORGANIZED HEALTH CARE EDUCATION/TRAINING PROGRAM
Payer: COMMERCIAL

## 2023-08-09 ENCOUNTER — NON-APPOINTMENT (OUTPATIENT)
Age: 33
End: 2023-08-09

## 2023-08-09 VITALS — DIASTOLIC BLOOD PRESSURE: 79 MMHG | SYSTOLIC BLOOD PRESSURE: 128 MMHG | HEART RATE: 99 BPM

## 2023-08-09 DIAGNOSIS — O26.899 OTHER SPECIFIED PREGNANCY RELATED CONDITIONS, UNSPECIFIED TRIMESTER: ICD-10-CM

## 2023-08-09 DIAGNOSIS — Z3A.00 WEEKS OF GESTATION OF PREGNANCY NOT SPECIFIED: ICD-10-CM

## 2023-08-09 LAB
ALBUMIN SERPL ELPH-MCNC: 3.5 G/DL — SIGNIFICANT CHANGE UP (ref 3.5–5.2)
ALP SERPL-CCNC: 116 U/L — HIGH (ref 30–115)
ALT FLD-CCNC: 16 U/L — SIGNIFICANT CHANGE UP (ref 0–41)
ANION GAP SERPL CALC-SCNC: 13 MMOL/L — SIGNIFICANT CHANGE UP (ref 7–14)
APPEARANCE UR: ABNORMAL
APTT BLD: 22.3 SEC — CRITICAL LOW (ref 27–39.2)
AST SERPL-CCNC: 16 U/L — SIGNIFICANT CHANGE UP (ref 0–41)
BASOPHILS # BLD AUTO: 0.02 K/UL — SIGNIFICANT CHANGE UP (ref 0–0.2)
BASOPHILS NFR BLD AUTO: 0.2 % — SIGNIFICANT CHANGE UP (ref 0–1)
BILIRUB SERPL-MCNC: <0.2 MG/DL — SIGNIFICANT CHANGE UP (ref 0.2–1.2)
BILIRUB UR-MCNC: NEGATIVE — SIGNIFICANT CHANGE UP
BUN SERPL-MCNC: 12 MG/DL — SIGNIFICANT CHANGE UP (ref 10–20)
CALCIUM SERPL-MCNC: 9.1 MG/DL — SIGNIFICANT CHANGE UP (ref 8.4–10.5)
CHLORIDE SERPL-SCNC: 104 MMOL/L — SIGNIFICANT CHANGE UP (ref 98–110)
CO2 SERPL-SCNC: 21 MMOL/L — SIGNIFICANT CHANGE UP (ref 17–32)
COLOR SPEC: YELLOW — SIGNIFICANT CHANGE UP
CREAT SERPL-MCNC: 0.6 MG/DL — LOW (ref 0.7–1.5)
DIFF PNL FLD: NEGATIVE — SIGNIFICANT CHANGE UP
EGFR: 122 ML/MIN/1.73M2 — SIGNIFICANT CHANGE UP
EOSINOPHIL # BLD AUTO: 0.08 K/UL — SIGNIFICANT CHANGE UP (ref 0–0.7)
EOSINOPHIL NFR BLD AUTO: 0.9 % — SIGNIFICANT CHANGE UP (ref 0–8)
FIBRINOGEN PPP-MCNC: 642 MG/DL — HIGH (ref 204.4–570.6)
GLUCOSE SERPL-MCNC: 98 MG/DL — SIGNIFICANT CHANGE UP (ref 70–99)
GLUCOSE UR QL: NEGATIVE MG/DL — SIGNIFICANT CHANGE UP
HCT VFR BLD CALC: 32.6 % — LOW (ref 37–47)
HGB BLD-MCNC: 10.9 G/DL — LOW (ref 12–16)
IMM GRANULOCYTES NFR BLD AUTO: 0.4 % — HIGH (ref 0.1–0.3)
INR BLD: 0.86 RATIO — SIGNIFICANT CHANGE UP (ref 0.65–1.3)
KETONES UR-MCNC: NEGATIVE MG/DL — SIGNIFICANT CHANGE UP
LDH SERPL L TO P-CCNC: 192 — SIGNIFICANT CHANGE UP (ref 50–242)
LEUKOCYTE ESTERASE UR-ACNC: ABNORMAL
LYMPHOCYTES # BLD AUTO: 1.68 K/UL — SIGNIFICANT CHANGE UP (ref 1.2–3.4)
LYMPHOCYTES # BLD AUTO: 18.8 % — LOW (ref 20.5–51.1)
MCHC RBC-ENTMCNC: 30.9 PG — SIGNIFICANT CHANGE UP (ref 27–31)
MCHC RBC-ENTMCNC: 33.4 G/DL — SIGNIFICANT CHANGE UP (ref 32–37)
MCV RBC AUTO: 92.4 FL — SIGNIFICANT CHANGE UP (ref 81–99)
MONOCYTES # BLD AUTO: 0.62 K/UL — HIGH (ref 0.1–0.6)
MONOCYTES NFR BLD AUTO: 6.9 % — SIGNIFICANT CHANGE UP (ref 1.7–9.3)
NEUTROPHILS # BLD AUTO: 6.51 K/UL — HIGH (ref 1.4–6.5)
NEUTROPHILS NFR BLD AUTO: 72.8 % — SIGNIFICANT CHANGE UP (ref 42.2–75.2)
NITRITE UR-MCNC: NEGATIVE — SIGNIFICANT CHANGE UP
NRBC # BLD: 0 /100 WBCS — SIGNIFICANT CHANGE UP (ref 0–0)
PH UR: 6.5 — SIGNIFICANT CHANGE UP (ref 5–8)
PLATELET # BLD AUTO: 204 K/UL — SIGNIFICANT CHANGE UP (ref 130–400)
PMV BLD: 10.5 FL — HIGH (ref 7.4–10.4)
POTASSIUM SERPL-MCNC: 3.9 MMOL/L — SIGNIFICANT CHANGE UP (ref 3.5–5)
POTASSIUM SERPL-SCNC: 3.9 MMOL/L — SIGNIFICANT CHANGE UP (ref 3.5–5)
PROT SERPL-MCNC: 5.3 G/DL — LOW (ref 6–8)
PROT UR-MCNC: NEGATIVE MG/DL — SIGNIFICANT CHANGE UP
PROTHROM AB SERPL-ACNC: 9.8 SEC — LOW (ref 9.95–12.87)
RBC # BLD: 3.53 M/UL — LOW (ref 4.2–5.4)
RBC # FLD: 14.1 % — SIGNIFICANT CHANGE UP (ref 11.5–14.5)
SODIUM SERPL-SCNC: 138 MMOL/L — SIGNIFICANT CHANGE UP (ref 135–146)
SP GR SPEC: 1.01 — SIGNIFICANT CHANGE UP (ref 1–1.03)
URATE SERPL-MCNC: 4 MG/DL — SIGNIFICANT CHANGE UP (ref 2.5–7)
UROBILINOGEN FLD QL: 0.2 MG/DL — SIGNIFICANT CHANGE UP (ref 0.2–1)
WBC # BLD: 8.95 K/UL — SIGNIFICANT CHANGE UP (ref 4.8–10.8)
WBC # FLD AUTO: 8.95 K/UL — SIGNIFICANT CHANGE UP (ref 4.8–10.8)

## 2023-08-09 RX ORDER — METOCLOPRAMIDE HCL 10 MG
5 TABLET ORAL ONCE
Refills: 0 | Status: COMPLETED | OUTPATIENT
Start: 2023-08-09 | End: 2023-08-09

## 2023-08-09 RX ADMIN — Medication 5 MILLIGRAM(S): at 22:25

## 2023-08-09 NOTE — OB PROVIDER TRIAGE NOTE - NSOBPROVIDERNOTE_OBGYN_ALL_OB_FT
33y  @ 39w3d, GBS positive, not in labor.  -Discharged Home  -Labor and preeclamptic precautions reviewed  -PO Hydration encouraged  -Reviewed fetal kick counts  -Follow up with scheduled OB visit  -Patient verbalized understanding 33y GP @ weeks, GBS, in labor/IOL    -Admit to L & D  -IV hydration, labs  -IV antibiotics  -Continuous EFM & TOCO monitoring  -Clear Liquid diet  -Pain Management PRN  IOL w/    Dr. montes

## 2023-08-09 NOTE — OB PROVIDER TRIAGE NOTE - HISTORY OF PRESENT ILLNESS
32yo  at 39w3d presents to L&D for worsening headache and vision changes. Pt reports she began having a headache at 0900 today intially rated as 5/10, that worsenied and is now 8/10 in severity. She took 2 extra strength Tylenol at 1900 w/o relief. She is also complaining of spots in her vision that began at around the same time. Associated LE swelling, palpitations and chest pain she describes as similar to her GERD symptoms. Denies SOB or abdominal pain. Denies LOF, VB, ctx and endorses good fetal movement  32yo  at 39w3d presents to L&D for worsening headache and vision changes. Pt reports she began having a headache at 0900 today initially rated as 5/10, that worsened and is now 8/10 in severity. She took 2 extra strength Tylenol at 1900 w/o relief. She is also complaining of spots in her vision that began at around the same time. Associated LE swelling, palpitations and chest pain she describes as similar to her GERD symptoms. Denies SOB or abdominal pain. Denies LOF, VB, ctx and endorses good fetal movement

## 2023-08-09 NOTE — OB RN TRIAGE NOTE - FALL HARM RISK - UNIVERSAL INTERVENTIONS
Bed in lowest position, wheels locked, appropriate side rails in place/Call bell, personal items and telephone in reach/Instruct patient to call for assistance before getting out of bed or chair/Non-slip footwear when patient is out of bed/[place] to call system/Physically safe environment - no spills, clutter or unnecessary equipment/Purposeful Proactive Rounding/Room/bathroom lighting operational, light cord in reach

## 2023-08-09 NOTE — OB PROVIDER TRIAGE NOTE - NSHPPHYSICALEXAM_GEN_ALL_CORE
Physical Exam  Vital Signs Last 24 Hrs  HR: 87 (09 Aug 2023 21:17) (87 - 99)  BP: 116/66 (09 Aug 2023 21:17) (116/66 - 128/79)  RR: 17 (09 Aug 2023 21:02) (17 - 17)    Gen: NAD, AOx3  Abdomen: soft, gravid, nontender, no palpable contractions  Cardio: RRR  Pulm:CTABL  DTR 2+    EFM: 135bpm/ moderate variability/ +accels  [place]: not ada    BSS: vertex

## 2023-08-09 NOTE — OB PROVIDER TRIAGE NOTE - NSICDXPASTMEDICALHX_GEN_ALL_CORE_FT
PAST MEDICAL HISTORY:  Fibroadenoma of breast     GERD (gastroesophageal reflux disease)     PCOS (polycystic ovarian syndrome)

## 2023-08-10 ENCOUNTER — APPOINTMENT (OUTPATIENT)
Dept: OBGYN | Facility: CLINIC | Age: 33
End: 2023-08-10

## 2023-08-10 DIAGNOSIS — O26.893 OTHER SPECIFIED PREGNANCY RELATED CONDITIONS, THIRD TRIMESTER: ICD-10-CM

## 2023-08-10 LAB
ABO RH CONFIRMATION: SIGNIFICANT CHANGE UP
BLD GP AB SCN SERPL QL: SIGNIFICANT CHANGE UP
CREAT ?TM UR-MCNC: 40 MG/DL — SIGNIFICANT CHANGE UP
PRENATAL SYPHILIS TEST: SIGNIFICANT CHANGE UP
PROT ?TM UR-MCNC: <5 MG/DLG/24H — SIGNIFICANT CHANGE UP
PROT/CREAT UR-RTO: <0.1 RATIO — SIGNIFICANT CHANGE UP (ref 0–0.2)

## 2023-08-10 PROCEDURE — 59025 FETAL NON-STRESS TEST: CPT

## 2023-08-10 PROCEDURE — 86592 SYPHILIS TEST NON-TREP QUAL: CPT

## 2023-08-10 PROCEDURE — 86850 RBC ANTIBODY SCREEN: CPT

## 2023-08-10 PROCEDURE — 85025 COMPLETE CBC W/AUTO DIFF WBC: CPT

## 2023-08-10 PROCEDURE — 86900 BLOOD TYPING SEROLOGIC ABO: CPT

## 2023-08-10 PROCEDURE — 86901 BLOOD TYPING SEROLOGIC RH(D): CPT

## 2023-08-10 PROCEDURE — 93005 ELECTROCARDIOGRAM TRACING: CPT

## 2023-08-10 PROCEDURE — 71045 X-RAY EXAM CHEST 1 VIEW: CPT

## 2023-08-10 PROCEDURE — 36415 COLL VENOUS BLD VENIPUNCTURE: CPT

## 2023-08-10 RX ORDER — OXYTOCIN 10 UNIT/ML
2 VIAL (ML) INJECTION
Qty: 30 | Refills: 0 | Status: DISCONTINUED | OUTPATIENT
Start: 2023-08-10 | End: 2023-08-11

## 2023-08-10 RX ORDER — SODIUM CHLORIDE 9 MG/ML
1000 INJECTION, SOLUTION INTRAVENOUS
Refills: 0 | Status: DISCONTINUED | OUTPATIENT
Start: 2023-08-10 | End: 2023-08-11

## 2023-08-10 RX ORDER — DEXAMETHASONE 0.5 MG/5ML
4 ELIXIR ORAL EVERY 6 HOURS
Refills: 0 | Status: DISCONTINUED | OUTPATIENT
Start: 2023-08-10 | End: 2023-08-11

## 2023-08-10 RX ORDER — ONDANSETRON 8 MG/1
4 TABLET, FILM COATED ORAL EVERY 6 HOURS
Refills: 0 | Status: DISCONTINUED | OUTPATIENT
Start: 2023-08-10 | End: 2023-08-11

## 2023-08-10 RX ORDER — OXYTOCIN 10 UNIT/ML
333.33 VIAL (ML) INJECTION
Qty: 20 | Refills: 0 | Status: DISCONTINUED | OUTPATIENT
Start: 2023-08-10 | End: 2023-08-11

## 2023-08-10 RX ORDER — ACETAMINOPHEN 500 MG
975 TABLET ORAL ONCE
Refills: 0 | Status: COMPLETED | OUTPATIENT
Start: 2023-08-10 | End: 2023-08-10

## 2023-08-10 RX ORDER — ACETAMINOPHEN 500 MG
975 TABLET ORAL DAILY
Refills: 0 | Status: DISCONTINUED | OUTPATIENT
Start: 2023-08-10 | End: 2023-08-10

## 2023-08-10 RX ORDER — AMPICILLIN TRIHYDRATE 250 MG
1 CAPSULE ORAL EVERY 4 HOURS
Refills: 0 | Status: DISCONTINUED | OUTPATIENT
Start: 2023-08-10 | End: 2023-08-11

## 2023-08-10 RX ORDER — ACETAMINOPHEN 500 MG
650 TABLET ORAL ONCE
Refills: 0 | Status: DISCONTINUED | OUTPATIENT
Start: 2023-08-10 | End: 2023-08-10

## 2023-08-10 RX ORDER — FENTANYL/BUPIVACAINE/NS/PF 2MCG/ML-.1
250 PLASTIC BAG, INJECTION (ML) INJECTION
Refills: 0 | Status: DISCONTINUED | OUTPATIENT
Start: 2023-08-10 | End: 2023-08-11

## 2023-08-10 RX ORDER — AMPICILLIN TRIHYDRATE 250 MG
2 CAPSULE ORAL ONCE
Refills: 0 | Status: COMPLETED | OUTPATIENT
Start: 2023-08-10 | End: 2023-08-10

## 2023-08-10 RX ORDER — NALOXONE HYDROCHLORIDE 4 MG/.1ML
0.1 SPRAY NASAL
Refills: 0 | Status: DISCONTINUED | OUTPATIENT
Start: 2023-08-10 | End: 2023-08-11

## 2023-08-10 RX ORDER — FAMOTIDINE 10 MG/ML
20 INJECTION INTRAVENOUS ONCE
Refills: 0 | Status: COMPLETED | OUTPATIENT
Start: 2023-08-10 | End: 2023-08-10

## 2023-08-10 RX ADMIN — Medication 200 GRAM(S): at 02:31

## 2023-08-10 RX ADMIN — Medication 108 GRAM(S): at 22:36

## 2023-08-10 RX ADMIN — Medication 108 GRAM(S): at 14:49

## 2023-08-10 RX ADMIN — Medication 108 GRAM(S): at 06:38

## 2023-08-10 RX ADMIN — Medication 108 GRAM(S): at 10:45

## 2023-08-10 RX ADMIN — FAMOTIDINE 20 MILLIGRAM(S): 10 INJECTION INTRAVENOUS at 16:52

## 2023-08-10 RX ADMIN — Medication 2 MILLIUNIT(S)/MIN: at 20:48

## 2023-08-10 RX ADMIN — Medication 108 GRAM(S): at 18:42

## 2023-08-10 RX ADMIN — Medication 975 MILLIGRAM(S): at 06:01

## 2023-08-10 NOTE — OB RN DELIVERY SUMMARY - NS_DELIVERYNEONATOLOGIST1_OBGYN_ALL_OB_FT
CHIEF COMPLAINT:  Left   shoulder   Current Symptoms:  pain    PREVIOUS XRAYS/SCANS: Yes,  X-Ray  PREVIOUS INJURY: None  PREVIOUS TREATMENT: physical therapy or exercise program for more than six weeks     Are you currently taking anything for Pain relief?  Yes Ibuprofen prn  Patient is ambulating today on his/her own with no weight-bearing issues / ambulatory aids.    WORK RELATED:  No  OCCUPATION: [employer]  CURRENT WORK STATUS:  working regular duty    REFERRING PHYSICIAN:  NA  @PMD@    Vitals (BP, Pulse, Ht/Wt) Obtained and recorded:  Yes  Medications reviewed with patient and recorded:  Yes  Tobacco use verified and recorded:  Yes  Allergies verified and recorded:   Yes  Latex Allergy?:  Patient denies allergy to latex.    PAST MEDICAL HISTORY  Past Medical History:   Diagnosis Date   • Abdominal pain, right upper quadrant 5/22/02   • Abdominal pain, right upper quadrant 2/9/03   • Abnormal PAP Smear about 2003    repeat PAP   • Papilloedema, unspecified 10/31/06    papillophlebitis   • Urinary incontinence     with movement   • Urinary tract infection    • Uterine fibroid jan 2016    coming for ablation       PAST SURGICAL HISTORY  Past Surgical History:   Procedure Laterality Date   • CORRECT BUNION     • CT ABDOMEN  12/3/03    wnl   • HYSTEROSCOPY ENDOMETRIAL ABLATION  1/27/16    polypectomy and ablation   • MR ANGIO BRAIN  10/20/06    MDI - Negative intracranial MR angiogram showing no sizable aneurysm, stenosis or vascular occlusion. 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   • MRI BRAIN  10/20/06    MDI - Negative MRI of brain & internal auditory cancal.  Hypoplastic R maxillary sinus.   • MRI ORBITS COMBO  10/31/06    MDI-WNL   • PAP SMEAR  10/08/2007    Negative   • SLING OPER STRES INCONTINENCE  8/2014    [place] scientific midurethral sling - [place]   • TONSILLECTOMY AND ADENOIDECTOMY     • ULTRASOUND ABDOMEN COMPLETE  11/2103    wnl   • VAGINAL DELIVERY      x 3   •  XRAY MAMMOGRAM DIAGNOSTIC BILA  12/016/2011    Negative   • XRAY MAMMOGRAM DIAGNOSTIC BILAT  08/04/2010    Negative   • XRAY MAMMOGRAM SCREENING BILAT  04/15/2008   • XRAY MAMMOGRAM SCREENING BILAT  06/02/2009    Negative       Have you ever had general anesthesia?   Yes  Have you or any family member had any problems with anesthesia? Yes - tingling legs    REVIEW OF SYSTEMS:  Eye Problem(s):glasses or contacts  ENT Problem(s):negative  Cardiovascular problem(s):negative  Respiratory problem(s):negative respiratory  Gastro-intestinal problem(s):negative GI  Genito-urinary problem(s):negative  Musculoskeletal problem(s):negative  Integumentary problem(s):negative  Neurological problem(s):negative  Psychiatric problem(s):negative  Endocrine problem(s):negative  Hematologic and/or Lymphatic problem(s):negative    Patient reports she does exercise.  Walking    At today’s visit, patient needs:  - Work Status record updated?:    No     Melanie Greenfield

## 2023-08-10 NOTE — PROCEDURE NOTE - ADDITIONAL PROCEDURE DETAILS
LOR7cm  Catheter threaded to 12cm LOR7cm  Catheter threaded to 12cm      Post Labor  Epidural/ Delivery  Evaluation Note:    Uncomplicated anesthetic for Vaginal Delivery.    Patient seen at bedside. Epidural to be removed by RN before patient transfer.  Patient moving B/L lower extremities.  Motor block appropriate and resolving. Vital Signs are stable. Pain well controlled.     Leo Score greater than 9    Mental Status:  __x__ Awake   ___x__ Alert   _____ Drowsy   _____ Sedated    Nausea/Vomiting:  __x__ NO  ______Yes,   See Post - Op Orders          Pain Scale (0-10):  _____    Treatment: __X__ None       Plan: Discharge:   ____Home       __X___Floor     _____Critical Care    _____

## 2023-08-10 NOTE — OB RN PATIENT PROFILE - FALL HARM RISK - UNIVERSAL INTERVENTIONS
Bed in lowest position, wheels locked, appropriate side rails in place/Call bell, personal items and telephone in reach/Instruct patient to call for assistance before getting out of bed or chair/Non-slip footwear when patient is out of bed/[place] to call system/Physically safe environment - no spills, clutter or unnecessary equipment/Purposeful Proactive Rounding/Room/bathroom lighting operational, light cord in reach

## 2023-08-10 NOTE — OB PROVIDER H&P - ASSESSMENT
33y  at 39w3d, GBS positive, IOL for cat II  - admit to L&D  - admission labs  - cont efm/toco  - pain management prn  - clear liquid diet  - S/p prolonged BP monitoring, all wnl, no criteria for gHTN, pre-eclamptic labs wnl  - Ampicillin for GBS ppx  - Currently with Cat 1 tracing, will attempt IOL with CRB     Dr. Mei aware

## 2023-08-10 NOTE — OB RN PATIENT PROFILE - PAIN SCALE PREFERRED, PROFILE
Medical Necessity Clause: This procedure was medically necessary because the lesion that was treated was: numerical 0-10

## 2023-08-10 NOTE — PROGRESS NOTE ADULT - ASSESSMENT
34yo  at 39w3d, GBS positive, IOL for cat II, s/p [place]oon, in early labor.      - Cont EFM/[place]  - IV Hydration  - Pain Management prn  - Monitor vitals  - Clear Liquids    Dr. Adam and Dr. Mei to be made aware.

## 2023-08-10 NOTE — OB RN DELIVERY SUMMARY - NSSELHIDDEN_OBGYN_ALL_OB_FT
[NS_DeliveryAttending1_OBGYN_ALL_OB_FT:[id number]],[NS_DeliveryAssist1_OBGYN_ALL_OB_FT:[id number]],[NS_DeliveryRN_OBGYN_ALL_OB_FT:[id number]]

## 2023-08-10 NOTE — OB PROVIDER H&P - HISTORY OF PRESENT ILLNESS
32yo  at 39w3d presents to L&D for worsening headache and vision changes. Pt reports she began having a headache at 0900 today initially rated as 5/10, that worsened and is now 8/10 in severity. She took 2 extra strength Tylenol at 1900 w/o relief. She is also complaining of spots in her vision that began at around the same time. Associated LE swelling, palpitations and chest pain she describes as similar to her GERD symptoms. Denies SOB or abdominal pain. Denies LOF, VB, ctx and endorses good fetal movement    ADDENDUM:   After prolonged monitoring, patient did not have any elevated BP's, pre-eclamptic labs were wnl, however spontaneous decelerations noted on EFM with spontaneous recovery, since patient is >39w counseled on IOL, patient agreed with plan.

## 2023-08-10 NOTE — PROCEDURE NOTE - NS_PARA_OBGYN_ALL_OB_NU
0
clear to auscultation bilaterally/airway patent/breath sounds equal/good air movement/respirations non-labored

## 2023-08-10 NOTE — PROGRESS NOTE ADULT - SUBJECTIVE AND OBJECTIVE BOX
Patient seen at bedside, resting comfortably. No complaints at this time.    T(C): 36.8 (08-10-23 @ 07:33), Max: 36.8 (08-10-23 @ 01:58)  HR: 96 (08-10-23 @ 14:59) (69 - 105)  BP: 116/65 (08-10-23 @ 14:59) (97/56 - 155/62)  RR: 16 (08-10-23 @ 07:33) (16 - 17)  SpO2: 97% (08-10-23 @ 12:15) (94% - 99%)    EFM: 140 bpm/mod/+accels  TOCO: q 3 mins  SVE: 4/50/-3, Balloon removed without difficulty    Meds:ampicillin  IVPB 1 Gram(s) IV Intermittent every 4 hours  dexAMETHasone  Injectable 4 milliGRAM(s) IV Push every 6 hours PRN  famotidine Injectable 20 milliGRAM(s) IV Push once  fentanyl (2 MICROgram(s)/mL) + bupivacaine 0.0625%  in 0.9% Sodium Chloride PCEA 250 milliLiter(s) Epidural PCA Continuous  lactated ringers. 1000 milliLiter(s) IV Continuous <Continuous>  naloxone Injectable 0.1 milliGRAM(s) IV Push every 3 minutes PRN  ondansetron Injectable 4 milliGRAM(s) IV Push every 6 hours PRN  oxytocin Infusion 333.333 milliUNIT(s)/Min IV Continuous <Continuous>      Labs:                        10.9   8.95  )-----------( 204      ( 09 Aug 2023 21:30 )             32.6       ABO RH Interpretation: A POS    Prenatal Syphilis Test: Nonreactive    Urinalysis (23 @ 21:32)   pH Urine: 6.5  Glucose Qualitative, Urine: Negative mg/dL  Blood, Urine: Negative  Color: Yellow  Urine Appearance: Cloudy  Bilirubin: Negative  Ketone - Urine: Negative mg/dL  Specific Gravity: 1.011  Protein, Urine: Negative mg/dL  Urobilinogen: 0.2 mg/dL  Nitrite: Negative  Leukocyte Esterase Concentration: Trace    A/P: +  33y  at 39w3d, GBS positive, IOL for cat II, s/p Balloon.    Plan:  Continue EFM/TOCO  Continue IV hydration  Continue Clear Liquid diet  Epidural in Place  Pain management PRN    Dr. Bender aware

## 2023-08-10 NOTE — CHART NOTE - NSCHARTNOTEFT_GEN_A_CORE
PGY3 NOTE    Spontaneous deceleration noted, approx 5min to 60bpm. Patient repositioned to left side, IVF bolus administered, and O2 mask. Recovered to 120bpm with moderate variability.     Dr Mei aware

## 2023-08-11 ENCOUNTER — TRANSCRIPTION ENCOUNTER (OUTPATIENT)
Age: 33
End: 2023-08-11

## 2023-08-11 LAB
BASOPHILS # BLD AUTO: 0.02 K/UL — SIGNIFICANT CHANGE UP (ref 0–0.2)
BASOPHILS NFR BLD AUTO: 0.1 % — SIGNIFICANT CHANGE UP (ref 0–1)
EOSINOPHIL # BLD AUTO: 0 K/UL — SIGNIFICANT CHANGE UP (ref 0–0.7)
EOSINOPHIL NFR BLD AUTO: 0 % — SIGNIFICANT CHANGE UP (ref 0–8)
HCT VFR BLD CALC: 33.1 % — LOW (ref 37–47)
HGB BLD-MCNC: 12.1 G/DL — SIGNIFICANT CHANGE UP (ref 12–16)
IMM GRANULOCYTES NFR BLD AUTO: 0.5 % — HIGH (ref 0.1–0.3)
LYMPHOCYTES # BLD AUTO: 0.67 K/UL — LOW (ref 1.2–3.4)
LYMPHOCYTES # BLD AUTO: 3.9 % — LOW (ref 20.5–51.1)
MCHC RBC-ENTMCNC: 33.7 PG — HIGH (ref 27–31)
MCHC RBC-ENTMCNC: 36.6 G/DL — SIGNIFICANT CHANGE UP (ref 32–37)
MCV RBC AUTO: 92.2 FL — SIGNIFICANT CHANGE UP (ref 81–99)
MONOCYTES # BLD AUTO: 0.56 K/UL — SIGNIFICANT CHANGE UP (ref 0.1–0.6)
MONOCYTES NFR BLD AUTO: 3.3 % — SIGNIFICANT CHANGE UP (ref 1.7–9.3)
NEUTROPHILS # BLD AUTO: 15.63 K/UL — HIGH (ref 1.4–6.5)
NEUTROPHILS NFR BLD AUTO: 92.2 % — HIGH (ref 42.2–75.2)
NRBC # BLD: 0 /100 WBCS — SIGNIFICANT CHANGE UP (ref 0–0)
PLATELET # BLD AUTO: 187 K/UL — SIGNIFICANT CHANGE UP (ref 130–400)
PMV BLD: 10.7 FL — HIGH (ref 7.4–10.4)
RBC # BLD: 3.59 M/UL — LOW (ref 4.2–5.4)
RBC # FLD: 14.3 % — SIGNIFICANT CHANGE UP (ref 11.5–14.5)
WBC # BLD: 16.97 K/UL — HIGH (ref 4.8–10.8)
WBC # FLD AUTO: 16.97 K/UL — HIGH (ref 4.8–10.8)

## 2023-08-11 PROCEDURE — 59510 CESAREAN DELIVERY: CPT

## 2023-08-11 PROCEDURE — 71045 X-RAY EXAM CHEST 1 VIEW: CPT | Mod: 26

## 2023-08-11 RX ORDER — IBUPROFEN 200 MG
600 TABLET ORAL EVERY 6 HOURS
Refills: 0 | Status: COMPLETED | OUTPATIENT
Start: 2023-08-11 | End: 2024-07-09

## 2023-08-11 RX ORDER — DIPHENHYDRAMINE HCL 50 MG
25 CAPSULE ORAL EVERY 6 HOURS
Refills: 0 | Status: DISCONTINUED | OUTPATIENT
Start: 2023-08-11 | End: 2023-08-14

## 2023-08-11 RX ORDER — OXYTOCIN 10 UNIT/ML
333.33 VIAL (ML) INJECTION
Qty: 20 | Refills: 0 | Status: DISCONTINUED | OUTPATIENT
Start: 2023-08-11 | End: 2023-08-14

## 2023-08-11 RX ORDER — DEXAMETHASONE 0.5 MG/5ML
4 ELIXIR ORAL EVERY 6 HOURS
Refills: 0 | Status: DISCONTINUED | OUTPATIENT
Start: 2023-08-11 | End: 2023-08-11

## 2023-08-11 RX ORDER — CEFAZOLIN SODIUM 1 G
2000 VIAL (EA) INJECTION ONCE
Refills: 0 | Status: COMPLETED | OUTPATIENT
Start: 2023-08-11 | End: 2023-08-11

## 2023-08-11 RX ORDER — ONDANSETRON 8 MG/1
4 TABLET, FILM COATED ORAL EVERY 6 HOURS
Refills: 0 | Status: DISCONTINUED | OUTPATIENT
Start: 2023-08-11 | End: 2023-08-11

## 2023-08-11 RX ORDER — LANOLIN
1 OINTMENT (GRAM) TOPICAL EVERY 6 HOURS
Refills: 0 | Status: DISCONTINUED | OUTPATIENT
Start: 2023-08-11 | End: 2023-08-14

## 2023-08-11 RX ORDER — AZITHROMYCIN 500 MG/1
500 TABLET, FILM COATED ORAL ONCE
Refills: 0 | Status: DISCONTINUED | OUTPATIENT
Start: 2023-08-11 | End: 2023-08-11

## 2023-08-11 RX ORDER — ENOXAPARIN SODIUM 100 MG/ML
40 INJECTION SUBCUTANEOUS EVERY 24 HOURS
Refills: 0 | Status: DISCONTINUED | OUTPATIENT
Start: 2023-08-12 | End: 2023-08-14

## 2023-08-11 RX ORDER — NALOXONE HYDROCHLORIDE 4 MG/.1ML
0.1 SPRAY NASAL
Refills: 0 | Status: DISCONTINUED | OUTPATIENT
Start: 2023-08-11 | End: 2023-08-11

## 2023-08-11 RX ORDER — MAGNESIUM HYDROXIDE 400 MG/1
30 TABLET, CHEWABLE ORAL
Refills: 0 | Status: DISCONTINUED | OUTPATIENT
Start: 2023-08-11 | End: 2023-08-14

## 2023-08-11 RX ORDER — FAMOTIDINE 10 MG/ML
20 INJECTION INTRAVENOUS ONCE
Refills: 0 | Status: COMPLETED | OUTPATIENT
Start: 2023-08-11 | End: 2023-08-11

## 2023-08-11 RX ORDER — OXYCODONE HYDROCHLORIDE 5 MG/1
5 TABLET ORAL ONCE
Refills: 0 | Status: DISCONTINUED | OUTPATIENT
Start: 2023-08-11 | End: 2023-08-14

## 2023-08-11 RX ORDER — SODIUM CHLORIDE 9 MG/ML
1000 INJECTION, SOLUTION INTRAVENOUS
Refills: 0 | Status: DISCONTINUED | OUTPATIENT
Start: 2023-08-11 | End: 2023-08-14

## 2023-08-11 RX ORDER — SIMETHICONE 80 MG/1
80 TABLET, CHEWABLE ORAL EVERY 4 HOURS
Refills: 0 | Status: DISCONTINUED | OUTPATIENT
Start: 2023-08-11 | End: 2023-08-14

## 2023-08-11 RX ORDER — CITRIC ACID/SODIUM CITRATE 300-500 MG
30 SOLUTION, ORAL ORAL ONCE
Refills: 0 | Status: COMPLETED | OUTPATIENT
Start: 2023-08-11 | End: 2023-08-11

## 2023-08-11 RX ORDER — MORPHINE SULFATE 50 MG/1
0.15 CAPSULE, EXTENDED RELEASE ORAL ONCE
Refills: 0 | Status: DISCONTINUED | OUTPATIENT
Start: 2023-08-11 | End: 2023-08-11

## 2023-08-11 RX ORDER — KETOROLAC TROMETHAMINE 30 MG/ML
30 SYRINGE (ML) INJECTION EVERY 6 HOURS
Refills: 0 | Status: DISCONTINUED | OUTPATIENT
Start: 2023-08-11 | End: 2023-08-12

## 2023-08-11 RX ORDER — OXYCODONE HYDROCHLORIDE 5 MG/1
5 TABLET ORAL
Refills: 0 | Status: DISCONTINUED | OUTPATIENT
Start: 2023-08-11 | End: 2023-08-14

## 2023-08-11 RX ORDER — ACETAMINOPHEN 500 MG
975 TABLET ORAL
Refills: 0 | Status: DISCONTINUED | OUTPATIENT
Start: 2023-08-11 | End: 2023-08-14

## 2023-08-11 RX ADMIN — FAMOTIDINE 20 MILLIGRAM(S): 10 INJECTION INTRAVENOUS at 06:20

## 2023-08-11 RX ADMIN — Medication 108 GRAM(S): at 10:24

## 2023-08-11 RX ADMIN — Medication 108 GRAM(S): at 06:20

## 2023-08-11 RX ADMIN — Medication 108 GRAM(S): at 02:44

## 2023-08-11 RX ADMIN — Medication 108 GRAM(S): at 14:15

## 2023-08-11 RX ADMIN — Medication 2 MILLIUNIT(S)/MIN: at 14:11

## 2023-08-11 RX ADMIN — Medication 30 MILLILITER(S): at 07:51

## 2023-08-11 NOTE — OB PROVIDER DELIVERY SUMMARY - NSSELHIDDEN_OBGYN_ALL_OB_FT
[NS_DeliveryAttending1_OBGYN_ALL_OB_FT:[id number]],[NS_DeliveryAssist1_OBGYN_ALL_OB_FT:[id number]],[NS_DeliveryRN_OBGYN_ALL_OB_FT:[id number]]

## 2023-08-11 NOTE — BRIEF OPERATIVE NOTE - OPERATION/FINDINGS
Pfannenstiel skin incision, low transverse uterine incision, 3-4 centimeter extension near R uterine angle extending inferiorly  Gravid uterus, Normal fallopian tubes and ovaries bilaterally.   Viable female infant in OP position, APGARs 9/9, weighing 3820g. Pfannenstiel skin incision, low transverse uterine incision, 3-4 centimeter extension to right side of the midline extending inferiorly towards the KASHIF  Gravid uterus, Normal fallopian tubes and ovaries bilaterally.   Viable female infant in direct OP position, APGARs 9/9, weighing 3820g.

## 2023-08-11 NOTE — PROGRESS NOTE ADULT - ASSESSMENT
33y  at 39w3d, GBS positive, IOL for cat II, s/p CRB, now on pitocin, doing well.     - Cont EFM/[place]  - IVF hydration  - Pain management w/ epidural  - Monitor vitals  - Clear liquid diet  - C/w ampicillin for GBS ppx  - C/w pitocin    Dr Bender aware

## 2023-08-11 NOTE — PROGRESS NOTE ADULT - SUBJECTIVE AND OBJECTIVE BOX
Patient fully dilated and pushing for 4 hours without descent of fetal head. Discussed need for primary  section. Explained r/b/a including bleeding, infection, injury to surrounding structures, VTE, anaesthesia and increased need for  section in future pregnancies. Expressed understanding and written informed consent obtained. On call to OR.

## 2023-08-11 NOTE — PROGRESS NOTE ADULT - SUBJECTIVE AND OBJECTIVE BOX
PGY3 Note    S: Patient seen and examined at bedside. Doing well, resting comfortably    Vital Signs Last 24 Hrs  T(C): 36.9 (10 Aug 2023 19:35), Max: 36.9 (10 Aug 2023 19:35)  T(F): 98.42 (10 Aug 2023 19:35), Max: 98.42 (10 Aug 2023 19:35)  HR: 75 (10 Aug 2023 23:59) (69 - 105)  BP: 113/67 (10 Aug 2023 23:59) (97/56 - 155/62)  RR: 16 (10 Aug 2023 19:35) (16 - 18)  SpO2: 97% (10 Aug 2023 12:15) (94% - 99%)    Parameters below as of 10 Aug 2023 01:58  Patient On (Oxygen Delivery Method): room air    EFM: 130/mod dotty/pos accel  TOCO: q2-5 min  SVE: 4/60/-3     Labs:                        10.9   8.95  )-----------( 204      ( 09 Aug 2023 21:30 )             32.6           138  |  104  |  12  ----------------------------<  98  3.9   |  21  |  0.6<L>    Ca    9.1      09 Aug 2023 21:30    TPro  5.3<L>  /  Alb  3.5  /  TBili  <0.2  /  DBili  x   /  AST  16  /  ALT  16  /  AlkPhos  116<H>      ABO RH Interpretation: A POS (08-10-23 @ 01:52)    Urinalysis Basic - ( 09 Aug 2023 21:32 )    Color: Yellow / Appearance: Cloudy / S.011 / pH: x  Gluc: x / Ketone: Negative mg/dL  / Bili: Negative / Urobili: 0.2 mg/dL   Blood: x / Protein: Negative mg/dL / Nitrite: Negative   Leuk Esterase: Trace / RBC: 1 /HPF / WBC 4 /HPF   Sq Epi: x / Non Sq Epi: 2 /HPF / Bacteria: Negative /HPF    Prenatal Syphilis Test: Nonreact (08-10-23 @ 01:52)    Pitocin @, now at 4mu

## 2023-08-11 NOTE — OB PROVIDER DELIVERY SUMMARY - NSPROVIDERDELIVERYNOTE_OBGYN_ALL_OB_FT
Primary low transverse uterine  delivery for arrest of descent.   Pfannenstiel skin incision, low transverse hysterotomy   Viable female infant, APGARs 9/9, weighing 3820g.      See operative course for complete details.

## 2023-08-11 NOTE — PROGRESS NOTE ADULT - ASSESSMENT
33y now P1, GBS positive, s/p primary LTCS for arrest of descent, , POD 0, recovering well.     -Monitor vitals and bleeding  -Pain management PRN  -Encourage ambulation  -PO hydration, regular diet  -Anticipate dc home in 2 dayswith instructions to f/u with OBGYN in 1 week for incision check.     Dr. Mei and Dr. Yu to be made aware.

## 2023-08-11 NOTE — OB RN INTRAOPERATIVE NOTE - NSSELHIDDEN_OBGYN_ALL_OB_FT
[NS_DeliveryAttending1_OBGYN_ALL_OB_FT:[id number]],[NS_DeliveryAssist1_OBGYN_ALL_OB_FT:[id number]],[NS_DeliveryRN_OBGYN_ALL_OB_FT:[id number]]

## 2023-08-11 NOTE — PROGRESS NOTE ADULT - ASSESSMENT
33y  at 39w5d, GBS positive, IOL for cat II, s/p CRB, now on pitocin, doing well.     - Cont EFM/[place]  - IVF hydration  - Pain management w/ epidural  - Monitor vitals  - Clear liquid diet  - C/w ampicillin for GBS ppx  - C/w pitocin, currently at 8mu/min    Dr. Bender and Dr. Yu to be made aware     33y  at 39w5d, GBS positive, IOL for cat II, s/p CRB, now on pitocin, doing well.     - Cont EFM/[place]  - IVF hydration  - Pain management w/ epidural  - Monitor vitals  - Clear liquid diet  - C/w ampicillin for GBS ppx  - C/w pitocin, currently at 8mu/min    Dr. Bender and Dr. Yu to be made aware     33y  at 39w5d, GBS positive, IOL for cat II, s/p CRB, now on pitocin, doing well.     - Cont EFM/[place]  - IVF hydration  - Pain management w/ epidural  - Monitor vitals  - Clear liquid diet  - C/w ampicillin for GBS ppx  - C/w pitocin, currently at 8mu/min    Dr. Bender and Dr. Yu to be made aware

## 2023-08-11 NOTE — CHART NOTE - NSCHARTNOTEFT_GEN_A_CORE
PACU ANESTHESIA ADMISSION NOTE          __x__  Patent Airway    __x__  Full return of protective reflexes    __x__  Full recovery from anesthesia / back to baseline status    Vitals:  T(C): 36.6 (08-11-23 @ 14:32), Max: 37.1 (08-11-23 @ 10:15)  HR: 83 (08-11-23 @ 15:07) (49 - 114)  BP: 128/76 (08-11-23 @ 15:07) (94/50 - 165/129)  RR: 18 (08-11-23 @ 05:57) (16 - 18)  SpO2: 96% (08-11-23 @ 14:48) (84% - 100%)    Mental Status:  __x__ Awake   ___x__ Alert   _____ Drowsy   _____ Sedated    Nausea/Vomiting:  __x__ NO  ______Yes,   See Post - Op Orders          Pain Scale (0-10):  _____    Treatment: ____ None    __x__ See Post - Op/PCA Orders    Post - Operative Fluids:   ____ Oral   __x__ See Post - Op Orders    Plan: Discharge:   ____Home       __x___Floor     _____Critical Care    _____  Other:_________________    Comments: Patient had smooth intraoperative event, no anesthesia complication.

## 2023-08-11 NOTE — BRIEF OPERATIVE NOTE - NSICDXBRIEFPOSTOP_GEN_ALL_CORE_FT
POST-OP DIAGNOSIS:  Arrest of descent, delivered, current hospitalization 11-Aug-2023 17:49:07  Robyn Degroot

## 2023-08-11 NOTE — PROGRESS NOTE ADULT - SUBJECTIVE AND OBJECTIVE BOX
PGY1 Note    Patient seen at bedside for labor progression. No complaints at the moment.    T(F): 98.24 ( @ 02:11), Max: 98.42 (08-10 @ 19:35)  HR: 76 ( @ 03:14)  BP: 112/64 ( @ 03:14) (97/56 - 155/62)  RR: 16 ( @ 02:11)  EFM: 130bpm/moderate variability/ +accels  TOCO: q 3-4  SVE: last exam /-3 by Dr. Lambert    Medications:  ampicillin  IVPB: 108 ( @ 02:44),  108 (08-10 @ 22:36),  108 (08-10 @ 18:42),  108 (08-10 @ 14:49),  108 (08-10 @ 10:45),  108 (08-10 @ 06:38)  ampicillin  IVPB: 200 (08-10 @ 02:31)  famotidine Injectable: 20 (08-10 @ 16:52)  metoclopramide Injectable: 5 ( @ 22:25)  oxytocin Infusion.: 2 (08-10 @ 20:38)      Labs:                        10.9   8.95  )-----------( 204      ( 09 Aug 2023 21:30 )             32.6         138  |  104  |  12  ----------------------------<  98  3.9   |  21  |  0.6<L>    Ca    9.1      09 Aug 2023 21:30    TPro  5.3<L>  /  Alb  3.5  /  TBili  <0.2  /  DBili  x   /  AST  16  /  ALT  16  /  AlkPhos  116<H>        Urinalysis Basic - ( 09 Aug 2023 21:32 )    Color: Yellow / Appearance: Cloudy / S.011 / pH: x  Gluc: x / Ketone: Negative mg/dL  / Bili: Negative / Urobili: 0.2 mg/dL   Blood: x / Protein: Negative mg/dL / Nitrite: Negative   Leuk Esterase: Trace / RBC: 1 /HPF / WBC 4 /HPF   Sq Epi: x / Non Sq Epi: 2 /HPF / Bacteria: Negative /HPF

## 2023-08-11 NOTE — PROGRESS NOTE ADULT - SUBJECTIVE AND OBJECTIVE BOX
Patient fully dilated since 1140. Has been pushing since this time with almost no descent of fetal head. still zero station. Giving 20min break and patient is exhausted then will resume pushing.  Tracing category I. Briefly discussed  section if not near delivery by 4 hour joshua.

## 2023-08-11 NOTE — CHART NOTE - NSCHARTNOTEFT_GEN_A_CORE
PGY3 NOTE    Patient evaluated at bedside, reports feeling tired and dizzy when laying in bed.  On physical exam, abdomen noted to be mildly distended, soft, fundus firm and at umbilicus. Incision dressing C/D/I. Minimal rubra lochia noted vaginally. Lei catheter in place with concentrated output ([id number] 60cc).    Vital Signs Last 24 Hrs  T(C): 36.9 (11 Aug 2023 17:40), Max: 37.1 (11 Aug 2023 10:15)  T(F): 98.4 (11 Aug 2023 17:40), Max: 98.78 (11 Aug 2023 10:15)  HR: 72 (11 Aug 2023 19:30) (49 - 114)  BP: 120/64 (11 Aug 2023 19:30) (94/50 - 165/129)  RR: 17 (11 Aug 2023 18:25) (16 - 18)  SpO2: 96% (11 Aug 2023 19:29) (84% - 100%)    Will administer bolus and draw stat CBC at this time.     Dr Mei aware PGY3 NOTE    Patient evaluated at bedside, reports feeling tired and dizzy when laying in bed.  On physical exam, abdomen noted to be mildly distended, soft, fundus firm and at umbilicus. Incision dressing C/D/I. Minimal rubra lochia noted vaginally. Lei catheter in place with concentrated output ([id number] 60cc).    Vital Signs Last 24 Hrs  T(C): 36.9 (11 Aug 2023 17:40), Max: 37.1 (11 Aug 2023 10:15)  T(F): 98.4 (11 Aug 2023 17:40), Max: 98.78 (11 Aug 2023 10:15)  HR: 72 (11 Aug 2023 19:30) (49 - 114)  BP: 120/64 (11 Aug 2023 19:30) (94/50 - 165/129)  RR: 17 (11 Aug 2023 18:25) (16 - 18)  SpO2: 96% (11 Aug 2023 19:29) (84% - 100%)    Will administer bolus and draw stat CBC at this time.     Dr Mei aware    ADDENDUM @ 2120: Patient's h/h returned at 12.1/33.1, increase likely due to concentration/dehydration. Patient reports her lightheadedness is slowly improving, and is sleeping comfortably, likely history of MIGUEL as patient also snoring. Patient on 2L NC, spO2 93-97%. Patient denies CP or SOB. Decreased to 1L, will monitor.     Dr Hamida montes.

## 2023-08-11 NOTE — CHART NOTE - NSCHARTNOTEFT_GEN_A_CORE
PGY3 NOTE    Patient evaluated at bedside for labor progression. Is comfortable with epidural.   EFM category I and reactive, 130/mod dotty/pos accel  [place] q2-3 min  SVE 4/60/-2 AROM heavy meconium    Will continue with pitocin.     Dr Bender aware

## 2023-08-11 NOTE — CHART NOTE - NSCHARTNOTEFT_GEN_A_CORE
PGY3 NOTE    Patient removed from oxygen, saturation decreased to high 80s. Oxygen readministered to 1L, saturation also improves in >94% when patient is awake and taking deep breaths. CXR also ordered    Dr Mei aware PGY3 NOTE    Patient removed from oxygen, saturation decreased to high 80s. Oxygen readministered to 1L, saturation also improves in >94% when patient is awake and taking deep breaths. Lungs CTAB. CXR ordered.     Dr Mei aware

## 2023-08-11 NOTE — OB PROVIDER DELIVERY SUMMARY - NSPOSTOPDXA_OBGYN_ALL_OB
Patient is still asking about her hemoglobin draw; orders have been faxed to [phone] as of yesterday. Please sign and fax back to [place] at [phone], so Teresitaallison Sterling can let patient know. She does not want to wait to have this addressed when patient is seen this afternoon. Same as Pre-Op Diagnosis

## 2023-08-11 NOTE — BRIEF OPERATIVE NOTE - TYPE OF ANESTHESIA
The patient is Stable - Low risk of patient condition declining or worsening    Shift Goals  Clinical Goals: Safety, neuro checks  Patient Goals: Rest  Family Goals: GLENDY    Progress made toward(s) clinical / shift goals:    Problem: Fall Risk  Goal: Patient will remain free from falls  Outcome: Progressing     Problem: Skin Integrity  Goal: Skin integrity is maintained or improved  Outcome: Progressing       Patient is not progressing towards the following goals: N/A       General

## 2023-08-11 NOTE — PROGRESS NOTE ADULT - SUBJECTIVE AND OBJECTIVE BOX
PGY1 NOTE  Chief Complaint: Postpartum    HPI: Pt doing well, pain well controlled. Pt complaining of nausea, SOB, and dizziness. Pt given bolus, stat cbc, which resulted nl and continuous vitals monitoring. No events, no complaints otherwise.   Ambulating: no  Voiding: morel   Diet: regular  Breastfeeding: no  Denies HA, lightheadedness, palpitations, N/V, fevers, chills, CP, SOB, LE edema, heavy vaginal bleeding.     PAST MEDICAL & SURGICAL HISTORY:  PCOS (polycystic ovarian syndrome)      GERD (gastroesophageal reflux disease)      Fibroadenoma of breast      No significant past surgical history      Physical Exam  Vital Signs Last 24 Hrs  T(F): 98.4 (11 Aug 2023 17:40), Max: 98.78 (11 Aug 2023 10:15)  HR: 87 (11 Aug 2023 22:04) (49 - 114)  BP: 110/70 (11 Aug 2023 22:00) (90/53 - 165/129)  RR: 17 (11 Aug 2023 18:25) (16 - 18)    Physical exam:  General - AAOx3, NAD  Heart - S1S2, RRR  Lungs - CTA BL  Abdomen:  - Soft, nontender, nondistended, BS+  - Fundus firm, nontender, below the umbilicus  Pelvis/Vagina - Normal rubra lochia  Extremities - No calf tenderness, no swelling    Labs:                        12.1   16.97 )-----------( 187      ( 11 Aug 2023 19:47 )             33.1                         10.9   8.95  )-----------( 204      ( 09 Aug 2023 21:30 )             32.6     ABO RH Interpretation: A POS (08-10-23 @ 01:52)  Antibody Screen: NEG (08-10-23 @ 01:52)        acetaminophen     Tablet .. 975 milliGRAM(s) Oral <User Schedule>, Routine  diphenhydrAMINE 25 milliGRAM(s) Oral every 6 hours, Routine PRN Pruritus  ibuprofen  Tablet. 600 milliGRAM(s) Oral every 6 hours, Routine  ketorolac   Injectable 30 milliGRAM(s) IV Push every 6 hours, Routine  lactated ringers. 1000 milliLiter(s) (125 mL/Hr) IV Continuous <Continuous>, Routine  lanolin Ointment 1 Application(s) Topical every 6 hours, Routine PRN Sore Nipples  magnesium hydroxide Suspension 30 milliLiter(s) Oral two times a day, Routine PRN Constipation  oxyCODONE    IR 5 milliGRAM(s) Oral once, Routine PRN Moderate to Severe Pain (4-10)  oxyCODONE    IR 5 milliGRAM(s) Oral every 3 hours, Routine PRN Moderate to Severe Pain (4-10)  oxytocin Infusion 333.333 milliUNIT(s)/Min (1000 mL/Hr) IV Continuous <Continuous>, Routine  simethicone 80 milliGRAM(s) Chew every 4 hours, Routine             PGY1 NOTE  Chief Complaint: Postpartum    HPI: Pt doing well, pain well controlled. Pt complaining of nausea, SOB, and dizziness. Pt given bolus, stat cbc, which resulted nl and continuous vitals monitoring. No events, no complaints otherwise.   Ambulating: no  Voiding: morel   Diet: regular  Breastfeeding: no  Denies HA, lightheadedness, palpitations, N/V, fevers, chills, CP, SOB, LE edema, heavy vaginal bleeding.     PAST MEDICAL & SURGICAL HISTORY:  PCOS (polycystic ovarian syndrome)      GERD (gastroesophageal reflux disease)      Fibroadenoma of breast      No significant past surgical history      Physical Exam  Vital Signs Last 24 Hrs  T(F): 98.4 (11 Aug 2023 17:40), Max: 98.78 (11 Aug 2023 10:15)  HR: 87 (11 Aug 2023 22:04) (49 - 114)  BP: 110/70 (11 Aug 2023 22:00) (90/53 - 165/129)  RR: 17 (11 Aug 2023 18:25) (16 - 18)    Physical exam:  General - AAOx3, NAD  Heart - S1S2, RRR  Lungs - CTA BL  Abdomen:  - Soft, nontender, nondistended, BS+  - Fundus firm, nontender, below the umbilicus  Pelvis/Vagina - Normal rubra lochia  Extremities - No calf tenderness, no swelling    U/O: (2137-8054) 160cc (3091-2232) 225cc (0824-7132) 300cc     Labs:                        12.1   16.97 )-----------( 187      ( 11 Aug 2023 19:47 )             33.1                         10.9   8.95  )-----------( 204      ( 09 Aug 2023 21:30 )             32.6     ABO RH Interpretation: A POS (08-10-23 @ 01:52)  Antibody Screen: NEG (08-10-23 @ 01:52)        acetaminophen     Tablet .. 975 milliGRAM(s) Oral <User Schedule>, Routine  diphenhydrAMINE 25 milliGRAM(s) Oral every 6 hours, Routine PRN Pruritus  ibuprofen  Tablet. 600 milliGRAM(s) Oral every 6 hours, Routine  ketorolac   Injectable 30 milliGRAM(s) IV Push every 6 hours, Routine  lactated ringers. 1000 milliLiter(s) (125 mL/Hr) IV Continuous <Continuous>, Routine  lanolin Ointment 1 Application(s) Topical every 6 hours, Routine PRN Sore Nipples  magnesium hydroxide Suspension 30 milliLiter(s) Oral two times a day, Routine PRN Constipation  oxyCODONE    IR 5 milliGRAM(s) Oral once, Routine PRN Moderate to Severe Pain (4-10)  oxyCODONE    IR 5 milliGRAM(s) Oral every 3 hours, Routine PRN Moderate to Severe Pain (4-10)  oxytocin Infusion 333.333 milliUNIT(s)/Min (1000 mL/Hr) IV Continuous <Continuous>, Routine  simethicone 80 milliGRAM(s) Chew every 4 hours, Routine             PGY1 NOTE  Chief Complaint: Postpartum    HPI: Pt doing well, pain well controlled. Pt complaining of nausea, SOB, and dizziness. Pt given bolus, stat cbc, which resulted nl and continuous vitals monitoring. No events, no complaints otherwise.   Ambulating: no  Voiding: morel   Diet: regular  Breastfeeding: no  Denies HA, lightheadedness, palpitations, N/V, fevers, chills, CP, SOB, LE edema, heavy vaginal bleeding.     PAST MEDICAL & SURGICAL HISTORY:  PCOS (polycystic ovarian syndrome)      GERD (gastroesophageal reflux disease)      Fibroadenoma of breast      No significant past surgical history      Physical Exam  Vital Signs Last 24 Hrs  T(F): 98.4 (11 Aug 2023 17:40), Max: 98.78 (11 Aug 2023 10:15)  HR: 87 (11 Aug 2023 22:04) (49 - 114)  BP: 110/70 (11 Aug 2023 22:00) (90/53 - 165/129)  RR: 17 (11 Aug 2023 18:25) (16 - 18)    Physical exam:  General - AAOx3, NAD  Heart - S1S2, RRR  Lungs - CTA BL  Abdomen:  - Soft, nontender, nondistended, BS+  - Fundus firm, nontender, below the umbilicus  Pelvis/Vagina - Normal rubra lochia  Extremities - No calf tenderness, no swelling    U/O: ([id number]) 160cc (0651-1029) 225cc ([id number]) 300cc     Labs:                        12.1   16.97 )-----------( 187      ( 11 Aug 2023 19:47 )             33.1                         10.9   8.95  )-----------( 204      ( 09 Aug 2023 21:30 )             32.6     ABO RH Interpretation: A POS (08-10-23 @ 01:52)  Antibody Screen: NEG (08-10-23 @ 01:52)        acetaminophen     Tablet .. 975 milliGRAM(s) Oral <User Schedule>, Routine  diphenhydrAMINE 25 milliGRAM(s) Oral every 6 hours, Routine PRN Pruritus  ibuprofen  Tablet. 600 milliGRAM(s) Oral every 6 hours, Routine  ketorolac   Injectable 30 milliGRAM(s) IV Push every 6 hours, Routine  lactated ringers. 1000 milliLiter(s) (125 mL/Hr) IV Continuous <Continuous>, Routine  lanolin Ointment 1 Application(s) Topical every 6 hours, Routine PRN Sore Nipples  magnesium hydroxide Suspension 30 milliLiter(s) Oral two times a day, Routine PRN Constipation  oxyCODONE    IR 5 milliGRAM(s) Oral once, Routine PRN Moderate to Severe Pain (4-10)  oxyCODONE    IR 5 milliGRAM(s) Oral every 3 hours, Routine PRN Moderate to Severe Pain (4-10)  oxytocin Infusion 333.333 milliUNIT(s)/Min (1000 mL/Hr) IV Continuous <Continuous>, Routine  simethicone 80 milliGRAM(s) Chew every 4 hours, Routine             PGY1 NOTE  Chief Complaint: Postpartum    HPI: Pt doing well, pain well controlled. Pt complaining of nausea, SOB, and dizziness. Pt given bolus, stat cbc, which resulted nl and continuous vitals monitoring. No events, no complaints otherwise.   Ambulating: no  Voiding: morel   Diet: regular  Breastfeeding: no  Denies HA, lightheadedness, palpitations, N/V, fevers, chills, CP, SOB, LE edema, heavy vaginal bleeding.     PAST MEDICAL & SURGICAL HISTORY:  PCOS (polycystic ovarian syndrome)      GERD (gastroesophageal reflux disease)      Fibroadenoma of breast      No significant past surgical history      Physical Exam  Vital Signs Last 24 Hrs  T(F): 98.4 (11 Aug 2023 17:40), Max: 98.78 (11 Aug 2023 10:15)  HR: 87 (11 Aug 2023 22:04) (49 - 114)  BP: 110/70 (11 Aug 2023 22:00) (90/53 - 165/129)  RR: 17 (11 Aug 2023 18:25) (16 - 18)    Physical exam:  General - AAOx3, NAD  Heart - S1S2, RRR  Lungs - CTA BL  Abdomen:  - Soft, nontender, nondistended, BS+  - Fundus firm, nontender, below the umbilicus  Pelvis/Vagina - Normal rubra lochia  Extremities - No calf tenderness, no swelling    U/O: ([id number]) 160cc (0720-4661) 225cc (9637-9863) 300cc     Labs:                        12.1   16.97 )-----------( 187      ( 11 Aug 2023 19:47 )             33.1                         10.9   8.95  )-----------( 204      ( 09 Aug 2023 21:30 )             32.6     ABO RH Interpretation: A POS (08-10-23 @ 01:52)  Antibody Screen: NEG (08-10-23 @ 01:52)        acetaminophen     Tablet .. 975 milliGRAM(s) Oral <User Schedule>, Routine  diphenhydrAMINE 25 milliGRAM(s) Oral every 6 hours, Routine PRN Pruritus  ibuprofen  Tablet. 600 milliGRAM(s) Oral every 6 hours, Routine  ketorolac   Injectable 30 milliGRAM(s) IV Push every 6 hours, Routine  lactated ringers. 1000 milliLiter(s) (125 mL/Hr) IV Continuous <Continuous>, Routine  lanolin Ointment 1 Application(s) Topical every 6 hours, Routine PRN Sore Nipples  magnesium hydroxide Suspension 30 milliLiter(s) Oral two times a day, Routine PRN Constipation  oxyCODONE    IR 5 milliGRAM(s) Oral once, Routine PRN Moderate to Severe Pain (4-10)  oxyCODONE    IR 5 milliGRAM(s) Oral every 3 hours, Routine PRN Moderate to Severe Pain (4-10)  oxytocin Infusion 333.333 milliUNIT(s)/Min (1000 mL/Hr) IV Continuous <Continuous>, Routine  simethicone 80 milliGRAM(s) Chew every 4 hours, Routine

## 2023-08-11 NOTE — BRIEF OPERATIVE NOTE - NSICDXBRIEFPREOP_GEN_ALL_CORE_FT
PRE-OP DIAGNOSIS:  Arrest of descent, delivered, current hospitalization 11-Aug-2023 17:49:01  Robyn Degroot

## 2023-08-11 NOTE — PROGRESS NOTE ADULT - SUBJECTIVE AND OBJECTIVE BOX
Patient seen and examined at bedside. Pain controlled with epidural  5/80/-2, vtx previously AROM heavy meconium  tracing category I  ctx q2-3min  continue with pitocin, re-examine PRN

## 2023-08-12 LAB
BASOPHILS # BLD AUTO: 0.03 K/UL — SIGNIFICANT CHANGE UP (ref 0–0.2)
BASOPHILS NFR BLD AUTO: 0.2 % — SIGNIFICANT CHANGE UP (ref 0–1)
EOSINOPHIL # BLD AUTO: 0.04 K/UL — SIGNIFICANT CHANGE UP (ref 0–0.7)
EOSINOPHIL NFR BLD AUTO: 0.3 % — SIGNIFICANT CHANGE UP (ref 0–8)
HCT VFR BLD CALC: 28.6 % — LOW (ref 37–47)
HGB BLD-MCNC: 9.7 G/DL — LOW (ref 12–16)
IMM GRANULOCYTES NFR BLD AUTO: 0.6 % — HIGH (ref 0.1–0.3)
LYMPHOCYTES # BLD AUTO: 1.79 K/UL — SIGNIFICANT CHANGE UP (ref 1.2–3.4)
LYMPHOCYTES # BLD AUTO: 13.1 % — LOW (ref 20.5–51.1)
MCHC RBC-ENTMCNC: 31.9 PG — HIGH (ref 27–31)
MCHC RBC-ENTMCNC: 33.9 G/DL — SIGNIFICANT CHANGE UP (ref 32–37)
MCV RBC AUTO: 94.1 FL — SIGNIFICANT CHANGE UP (ref 81–99)
MONOCYTES # BLD AUTO: 0.64 K/UL — HIGH (ref 0.1–0.6)
MONOCYTES NFR BLD AUTO: 4.7 % — SIGNIFICANT CHANGE UP (ref 1.7–9.3)
NEUTROPHILS # BLD AUTO: 11.04 K/UL — HIGH (ref 1.4–6.5)
NEUTROPHILS NFR BLD AUTO: 81.1 % — HIGH (ref 42.2–75.2)
NRBC # BLD: 0 /100 WBCS — SIGNIFICANT CHANGE UP (ref 0–0)
PLATELET # BLD AUTO: 169 K/UL — SIGNIFICANT CHANGE UP (ref 130–400)
PMV BLD: 10.3 FL — SIGNIFICANT CHANGE UP (ref 7.4–10.4)
RBC # BLD: 3.04 M/UL — LOW (ref 4.2–5.4)
RBC # FLD: 14.4 % — SIGNIFICANT CHANGE UP (ref 11.5–14.5)
WBC # BLD: 13.62 K/UL — HIGH (ref 4.8–10.8)
WBC # FLD AUTO: 13.62 K/UL — HIGH (ref 4.8–10.8)

## 2023-08-12 RX ORDER — IBUPROFEN 200 MG
600 TABLET ORAL EVERY 6 HOURS
Refills: 0 | Status: DISCONTINUED | OUTPATIENT
Start: 2023-08-12 | End: 2023-08-14

## 2023-08-12 RX ADMIN — Medication 975 MILLIGRAM(S): at 21:48

## 2023-08-12 RX ADMIN — Medication 975 MILLIGRAM(S): at 08:41

## 2023-08-12 RX ADMIN — Medication 600 MILLIGRAM(S): at 18:37

## 2023-08-12 RX ADMIN — Medication 975 MILLIGRAM(S): at 15:13

## 2023-08-12 RX ADMIN — Medication 600 MILLIGRAM(S): at 12:34

## 2023-08-12 RX ADMIN — Medication 30 MILLIGRAM(S): at 06:30

## 2023-08-12 RX ADMIN — SIMETHICONE 80 MILLIGRAM(S): 80 TABLET, CHEWABLE ORAL at 06:04

## 2023-08-12 RX ADMIN — Medication 975 MILLIGRAM(S): at 14:43

## 2023-08-12 RX ADMIN — Medication 975 MILLIGRAM(S): at 09:11

## 2023-08-12 RX ADMIN — Medication 600 MILLIGRAM(S): at 13:04

## 2023-08-12 RX ADMIN — SIMETHICONE 80 MILLIGRAM(S): 80 TABLET, CHEWABLE ORAL at 21:48

## 2023-08-12 RX ADMIN — SIMETHICONE 80 MILLIGRAM(S): 80 TABLET, CHEWABLE ORAL at 14:44

## 2023-08-12 RX ADMIN — Medication 600 MILLIGRAM(S): at 18:07

## 2023-08-12 RX ADMIN — SIMETHICONE 80 MILLIGRAM(S): 80 TABLET, CHEWABLE ORAL at 12:11

## 2023-08-12 RX ADMIN — Medication 975 MILLIGRAM(S): at 22:02

## 2023-08-12 RX ADMIN — ENOXAPARIN SODIUM 40 MILLIGRAM(S): 100 INJECTION SUBCUTANEOUS at 06:04

## 2023-08-12 RX ADMIN — Medication 30 MILLIGRAM(S): at 06:04

## 2023-08-12 RX ADMIN — SIMETHICONE 80 MILLIGRAM(S): 80 TABLET, CHEWABLE ORAL at 18:07

## 2023-08-12 NOTE — PROGRESS NOTE ADULT - SUBJECTIVE AND OBJECTIVE BOX
GREGORY GARG  33y  Female    PGY1 Note:    Patient seen and examined bedside. Reports CP improved since last night, no labor breathing this morning. Reports using incentive spirometer. Denies heavy vaginal bleeding. Pain well controlled. Denies headaches, vision changes, dizziness, lightheadedness, CP, SOB, palpitations, RUQ pain. Denies fever, chills, nausea/vomiting. Not yet ambulating, morel in place. Urine in morel continues to appear bloody, but more clear as of this morning, output adequate. Discussed PO hydration. Tolerating liquids, passing flatus, no BM. Breastfeeding.     Physical Exam  Vital Signs Last 24 Hrs  T(C): 37.6 (12 Aug 2023 03:00), Max: 37.6 (12 Aug 2023 03:00)  T(F): 99.6 (12 Aug 2023 03:00), Max: 99.6 (12 Aug 2023 03:00)  HR: 85 (12 Aug 2023 03:00) (49 - 114)  BP: 104/59 (12 Aug 2023 03:00) (87/50 - 165/129)  RR: 20 (12 Aug 2023 03:00) (16 - 20)  SpO2: 95% (12 Aug 2023 02:39) (84% - 100%)    Gen: NAD, sitting comfortably  CV: RRR. No murmurs gallops or rubs.  Pulm: CTAB. No wheezes or rales.  Ext: No calf tenderness, no swelling.   Abd: Nondistended, soft, nontender, BS+, fundus firm, and below umbilicus.   Lochia: Minimal rubra  Wound: Dressing changed. Pfannenstiel skin incision clean, dry intact. Steris in place. No surrounding edema or erythema.    UO: (9971 - 4380) 400cc    PAST MEDICAL & SURGICAL HISTORY:  PCOS (polycystic ovarian syndrome)  GERD (gastroesophageal reflux disease)  Fibroadenoma of breast  No significant past surgical history    Diet: liquid as tolerated    Labs:                        12.1   16.97 )-----------( 187      ( 11 Aug 2023 19:47 )             33.1                         10.9   8.95  )-----------( 204      ( 09 Aug 2023 21:30 )             32.6        acetaminophen     Tablet .. 975 milliGRAM(s) Oral <User Schedule>  diphenhydrAMINE 25 milliGRAM(s) Oral every 6 hours PRN  enoxaparin Injectable 40 milliGRAM(s) SubCutaneous every 24 hours  ibuprofen  Tablet. 600 milliGRAM(s) Oral every 6 hours  lactated ringers. 1000 milliLiter(s) IV Continuous <Continuous>  lanolin Ointment 1 Application(s) Topical every 6 hours PRN  magnesium hydroxide Suspension 30 milliLiter(s) Oral two times a day PRN  oxyCODONE    IR 5 milliGRAM(s) Oral once PRN  oxyCODONE    IR 5 milliGRAM(s) Oral every 3 hours PRN  oxytocin Infusion 333.333 milliUNIT(s)/Min IV Continuous <Continuous>  simethicone 80 milliGRAM(s) Chew every 4 hours

## 2023-08-12 NOTE — CHART NOTE - NSCHARTNOTEFT_GEN_A_CORE
PGY3 NOTE    Patient evaluated at bedside. Reports she feels well, denies any lightheadedness, CP, or SOB. She was able to sleep very well for the last couple of hours and now feels well rested.    Vital Signs Last 24 Hrs  T(C): 37 (11 Aug 2023 23:15), Max: 37.1 (11 Aug 2023 10:15)  T(F): 98.6 (11 Aug 2023 23:15), Max: 98.78 (11 Aug 2023 10:15)  HR: 89 (12 Aug 2023 02:24) (49 - 114)  BP: 101/57 (12 Aug 2023 02:00) (87/50 - 165/129)  RR: 17 (11 Aug 2023 18:25) (16 - 18)  SpO2: 94% (12 Aug 2023 02:24) (84% - 100%)    Patient stable for transfer to .     Dr Hamida montes.

## 2023-08-12 NOTE — PROGRESS NOTE ADULT - ASSESSMENT
A/P: 33y now P1 s/p LTCS for arrest of labor, EBL 800cc, POD #1, recovering well.     -PO hydration encouraged  -regular diet as tolerated  -lovenox ordered for DVT prophylaxis  -Incentive Spirometry encouraged  -pain management per routine  -UO adequate  -f/u AM CBC   - f/u CXR    Dr. Adam and Dr. Mei to be made aware.

## 2023-08-13 ENCOUNTER — TRANSCRIPTION ENCOUNTER (OUTPATIENT)
Age: 33
End: 2023-08-13

## 2023-08-13 PROCEDURE — 93010 ELECTROCARDIOGRAM REPORT: CPT

## 2023-08-13 RX ORDER — IBUPROFEN 200 MG
1 TABLET ORAL
Qty: 0 | Refills: 0 | DISCHARGE
Start: 2023-08-13

## 2023-08-13 RX ORDER — ACETAMINOPHEN 500 MG
3 TABLET ORAL
Qty: 0 | Refills: 0 | DISCHARGE
Start: 2023-08-13

## 2023-08-13 RX ORDER — SIMETHICONE 80 MG/1
1 TABLET, CHEWABLE ORAL
Qty: 0 | Refills: 0 | DISCHARGE
Start: 2023-08-13

## 2023-08-13 RX ADMIN — Medication 600 MILLIGRAM(S): at 23:41

## 2023-08-13 RX ADMIN — Medication 975 MILLIGRAM(S): at 09:40

## 2023-08-13 RX ADMIN — SIMETHICONE 80 MILLIGRAM(S): 80 TABLET, CHEWABLE ORAL at 22:04

## 2023-08-13 RX ADMIN — Medication 975 MILLIGRAM(S): at 21:38

## 2023-08-13 RX ADMIN — SIMETHICONE 80 MILLIGRAM(S): 80 TABLET, CHEWABLE ORAL at 03:44

## 2023-08-13 RX ADMIN — Medication 975 MILLIGRAM(S): at 16:00

## 2023-08-13 RX ADMIN — Medication 975 MILLIGRAM(S): at 21:04

## 2023-08-13 RX ADMIN — SIMETHICONE 80 MILLIGRAM(S): 80 TABLET, CHEWABLE ORAL at 11:40

## 2023-08-13 RX ADMIN — Medication 975 MILLIGRAM(S): at 03:29

## 2023-08-13 RX ADMIN — Medication 975 MILLIGRAM(S): at 15:27

## 2023-08-13 RX ADMIN — MAGNESIUM HYDROXIDE 30 MILLILITER(S): 400 TABLET, CHEWABLE ORAL at 06:47

## 2023-08-13 RX ADMIN — Medication 600 MILLIGRAM(S): at 06:47

## 2023-08-13 RX ADMIN — Medication 975 MILLIGRAM(S): at 09:08

## 2023-08-13 RX ADMIN — SIMETHICONE 80 MILLIGRAM(S): 80 TABLET, CHEWABLE ORAL at 17:22

## 2023-08-13 RX ADMIN — Medication 600 MILLIGRAM(S): at 18:38

## 2023-08-13 RX ADMIN — Medication 975 MILLIGRAM(S): at 03:44

## 2023-08-13 RX ADMIN — ENOXAPARIN SODIUM 40 MILLIGRAM(S): 100 INJECTION SUBCUTANEOUS at 06:00

## 2023-08-13 RX ADMIN — Medication 600 MILLIGRAM(S): at 11:40

## 2023-08-13 RX ADMIN — SIMETHICONE 80 MILLIGRAM(S): 80 TABLET, CHEWABLE ORAL at 06:48

## 2023-08-13 NOTE — DISCHARGE NOTE OB - HOSPITAL COURSE
"[facility] - [facility] Intensive Care  Endocrinology  Progress Note    Admit Date: 6/13/2022     Reason for Consult: Management of  type 2 DM, Hyperglycemia     Surgical Procedure and Date: none    Diabetes diagnosis year: 4/21/21    Home Diabetes Medications:  Detemir  23  units daily and Aspart   12  units TIDWM and  Mod dose correction insulin    Lab Results   Component Value Date    HGBA1C 9.2 (H) 05/20/2022           How often checking glucose at home? 1-3 x day   BG readings on regimen: 180- up to 300 once  Hypoglycemia on the regimen?  yes once- related to not really eating after taking aspart   Missed doses on regimen?  no    Diabetes Complications include:     Hyperglycemia    Complicating diabetes co morbidities:   History of MI, CHF, and CKD      HPI:   Patient is a 38 y.o. male with a diagnosis of type 2 DM and NIDCM with BiV systolic heart failure. Patient was presented at [facility] 4/2/22  and was  approved for VAD. Also recently admitted with [facility]; he had clinic appointment last week to f/u recent admit for hyperglycemic hyperosmolar syndrome but did not come as he was "feeling too bad" presents to  ED with SOB. Endocrinology consulted for BG/ DM management.                Interval HPI:   Overnight events: Remains in ICU. NAEON. LVAD. OR for closure today. BG at or above goal; insulin infusion up to 10.5 u/hr. Creatinine 2.5.Day of Surgery  Eating:  Diet NPO  Nausea: No  Hypoglycemia and intervention: No  Fever: No  TPN and/or TF: No    /61   Pulse (!) 123   Temp (!) 102.38 °F (39.1 °C)   Resp (!) 23   Ht 6' 3" (1.905 m)   Wt 93.7 kg (206 lb 9.1 oz)   SpO2 100%   BMI 25.82 kg/m²     Labs Reviewed and Include    Recent Labs   Lab 06/30/22 2015   *   CALCIUM 8.8   ALBUMIN 3.6   PROT 6.7      K 4.0   CO2 21*      BUN 53*   CREATININE 2.3*   ALKPHOS 91   ALT 38   *   BILITOT 2.0*     Lab Results   Component Value Date    WBC 29.72 (H) 06/30/2022    HGB 8.8 (L) " 06/30/2022    HCT 27 (L) 06/30/2022    MCV 87 06/30/2022     06/30/2022     No results for input(s): TSH, FREET4 in the last 168 hours.  Lab Results   Component Value Date    HGBA1C 9.2 (H) 05/20/2022       Nutritional status:   Body mass index is 25.82 kg/m².  Lab Results   Component Value Date    ALBUMIN 3.6 06/30/2022    ALBUMIN 3.2 (L) 06/30/2022    ALBUMIN 3.3 (L) 06/30/2022     Lab Results   Component Value Date    PREALBUMIN 19 (L) 06/30/2022    PREALBUMIN 36 04/18/2022       Estimated Creatinine Clearance: 52.6 mL/min (A) (based on SCr of 2.3 mg/dL (H)).    Accu-Checks  Recent Labs     06/30/22  1003 06/30/22  1322 06/30/22  1413 06/30/22  1508 06/30/22  1607 06/30/22  1709 06/30/22  1800 06/30/22  1924 06/30/22  2018 06/30/22  2114   POCTGLUCOSE 214* 284* 223* 200* 145* 140* 124* 160* 180* 191*       Current Medications and/or Treatments Impacting Glycemic Control  Immunotherapy:    Immunosuppressants       None          Steroids:   Hormones (From admission, onward)                None          Pressors:    Autonomic Drugs (From admission, onward)                Start     Stop Route Frequency Ordered    06/29/22 1915  EPINEPHrine (ADRENALIN) 5 mg in dextrose 5 % 250 mL infusion         -- IV Continuous 06/29/22 1904          Hyperglycemia/Diabetes Medications:   Antihyperglycemics (From admission, onward)                Start     Stop Route Frequency Ordered    06/29/22 1645  insulin regular in 0.9 % NaCl 100 unit/100 mL (1 unit/mL) infusion        Question:  Enter initial dose from Infusion Protocol Chart (Units/hr):  Answer:  1    -- IV Continuous 06/29/22 1548            ASSESSMENT and PLAN    * Acute on chronic combined systolic and diastolic heart failure, NYHA class 4  Optimize glucose control and  avoid hypoglycemia    Managed per primary.       Uncontrolled diabetes mellitus  Endocrinology consulted for BG management.   BG goal 140-180      Continue IV insulin infusion protocol  Requires  intensive BG monitoring while on protocol     Will likely transition in AM.     ** Please notify Endocrine for any change and/or advance in diet**  ** Please call Endocrine for any BG related issues **    Discharge Planning:   TBD. Please notify endocrinology prior to discharge.        Surgical wound present  Optimize BG for surgical wound healing.         CKD (chronic kidney disease) stage 3, GFR 30-59 ml/min    Titrate insulin slowly to avoid hypoglycemia as the risk of hypoglycemia increases with decreased creatinine clearance.    Estimated Creatinine Clearance: 52.6 mL/min (A) (based on SCr of 2.3 mg/dL (H)).          Yumiko Fowler NP  Endocrinology  [facility] - [facility]   Patient underwent an uncomplicated primary LTCS for arrest of descent. . H/H as below. Patient’s postoperative course was unremarkable and she remained hemodynamically stable and afebrile throughout. Upon discharge on POD3, the patient is ambulating and voiding spontaneously, tolerating oral intake, pain was well controlled with oral medication, and vital signs were stable.

## 2023-08-13 NOTE — DISCHARGE NOTE OB - MEDICATION SUMMARY - MEDICATIONS TO TAKE
I will START or STAY ON the medications listed below when I get home from the hospital:    ibuprofen 600 mg oral tablet  -- 1 tab(s) by mouth every 6 hours  -- Indication: For pain    acetaminophen 325 mg oral tablet  -- 3 tab(s) by mouth every 6 hours as needed for pain  -- Indication: For pain    simethicone 80 mg oral tablet, chewable  -- 1 tab(s) by mouth every 4 hours  -- Indication: For gas

## 2023-08-13 NOTE — PROGRESS NOTE ADULT - ASSESSMENT
32 yo now P1, s/p primary  section for arrest of descent, POD2, recovering well    - routine postpartum care  - regular diet, PO hydration  - monitor vitals and bleeding  - pain management PRN  - lovenox DVT ppx  - encourage ambulation and incentive spirometry  - discharge tomorrow

## 2023-08-13 NOTE — CHART NOTE - NSCHARTNOTEFT_GEN_A_CORE
PGY 1 Note    Pt seen and evaluated at bedside for chest pain. Reporting 6/10 in intensity, radiating to the back. Denies SOB, palpitations, LE swelling, heavy vaginal bleeding, dizziness, n/v, abdominal pain or headache.     On exam: chest pain midsternal, reproducible on palpation. Lungs cta b/l. Minimal vaginal bleeding. EKG ordered. Due to take for pepcid in 1 hour for hx of GERD.     Dr. Mei and Dr. Yu aware.

## 2023-08-13 NOTE — PROGRESS NOTE ADULT - SUBJECTIVE AND OBJECTIVE BOX
OB Attending Post Partum Note    Subjective: Patient seen and examined at bedside. Doing well, no complaints. Abdominal pain controlled with meds. Minimal vaginal bleeding. Denies fever, chills, CP, SOB, N/V, LE pain. She is ambulating, voiding, passing flatus, no BM, tolerating regular diet, bottle feeding    Physical exam:    Vital Signs Last 24 Hrs  T(C): 36.8 (13 Aug 2023 08:39), Max: 37.4 (12 Aug 2023 19:03)  T(F): 98.2 (13 Aug 2023 08:39), Max: 99.4 (12 Aug 2023 19:03)  HR: 92 (13 Aug 2023 08:39) (68 - 98)  BP: 124/56 (13 Aug 2023 08:39) (96/53 - 124/56)  RR: 18 (13 Aug 2023 08:39) (18 - 18)  SpO2: 98% (12 Aug 2023 12:07) (98% - 98%)        Gen: NAD  CVS: s1s2, rrr  Lungs: ctab, no rhonchi or rales  Abdomen: Soft, nontender, no distension , firm uterine fundus below umbilicus.  Incision: c/d/i, steris in place over well healing pfannenstiel skin incision  Pelvic: Normal lochia noted  Ext: No calf tenderness    Diet: regular  meds:   acetaminophen     Tablet ..   975 milliGRAM(s) Oral (08-13-23 @ 03:29)   975 milliGRAM(s) Oral (08-12-23 @ 21:48)   975 milliGRAM(s) Oral (08-12-23 @ 14:43)    enoxaparin Injectable   40 milliGRAM(s) SubCutaneous (08-13-23 @ 06:00)    ibuprofen  Tablet.   600 milliGRAM(s) Oral (08-13-23 @ 06:47)   600 milliGRAM(s) Oral (08-12-23 @ 18:07)   600 milliGRAM(s) Oral (08-12-23 @ 12:34)    magnesium hydroxide Suspension   30 milliLiter(s) Oral (08-13-23 @ 06:47)    simethicone   80 milliGRAM(s) Chew (08-13-23 @ 06:48)   80 milliGRAM(s) Chew (08-13-23 @ 03:44)   80 milliGRAM(s) Chew (08-12-23 @ 21:48)   80 milliGRAM(s) Chew (08-12-23 @ 18:07)   80 milliGRAM(s) Chew (08-12-23 @ 14:44)   80 milliGRAM(s) Chew (08-12-23 @ 12:11)        LABS:                        9.7    13.62 )-----------( 169      ( 12 Aug 2023 11:25 )             28.6                         12.1   16.97 )-----------( 187      ( 11 Aug 2023 19:47 )             33.1

## 2023-08-13 NOTE — DISCHARGE NOTE OB - NS MD DC FALL RISK RISK
For information on Fall & Injury Prevention, visit: [email or web address] OR  [email or web address] OR  https://www.cdc.gov/steadi/patient.html

## 2023-08-13 NOTE — DISCHARGE NOTE OB - CARE PROVIDER_API CALL
Leana Mei  Obstetrics and Gynecology  [address] [place] [id number]  Phone: [phone]  Fax: [phone]  Established Patient  Follow Up Time:

## 2023-08-13 NOTE — DISCHARGE NOTE OB - CARE PLAN
1 Principal Discharge DX:	Single delivery by  section  Assessment and plan of treatment:	34 yo now P1, s/p primary  section for arrest of descent

## 2023-08-13 NOTE — DISCHARGE NOTE OB - PATIENT PORTAL LINK FT
You can access the FollowMyHealth Patient Portal offered by [facility] by registering at the following website: [email or web address]. By joining [employer]’s FollowMyHealth portal, you will also be able to view your health information using other applications (apps) compatible with our system.

## 2023-08-13 NOTE — DISCHARGE NOTE OB - ADDITIONAL INSTRUCTIONS
PAIN MANAGEMENT:   Alternate Acetaminophen/Tylenol and Ibuprofen/Motrin (if you are eligible). Each of these medications can be taken every six hours. Try to stagger them so that you are taking something for pain every three hours (ex. Take Motrin at 12:00, Tylenol at 3:00, Motrin at 6:00, etc.) to maximize pain relief.  o Dosages       - Tylenol – 500-650 mg every 6 hours as needed       - Motrin/Ibuprofen - 600 mg every 6 hours as needed  o The maximum dose of Tylenol is 3000 mg in 24 hours, the maximum dose of Motrin/ibuprofen is 2400mg in 24 hours    No heavy lifting.   Nothing inside the vagina for 6 weeks: no tampons, douching, sexual intercourse, tub baths or pools.   If you have a fever over 100.4F, severe abdominal pain or heavy vaginal bleeding please call your doctor or go to the emergency room.  Please follow up with your OBGYN in 1 week for an incision check and 6 weeks for a postpartum visit.

## 2023-08-14 VITALS
DIASTOLIC BLOOD PRESSURE: 56 MMHG | SYSTOLIC BLOOD PRESSURE: 111 MMHG | HEART RATE: 85 BPM | OXYGEN SATURATION: 98 % | TEMPERATURE: 98 F | RESPIRATION RATE: 18 BRPM

## 2023-08-14 RX ORDER — CYCLOBENZAPRINE HYDROCHLORIDE 10 MG/1
1 TABLET, FILM COATED ORAL
Qty: 5 | Refills: 0
Start: 2023-08-14 | End: 2023-08-18

## 2023-08-14 RX ADMIN — Medication 600 MILLIGRAM(S): at 00:14

## 2023-08-14 RX ADMIN — ENOXAPARIN SODIUM 40 MILLIGRAM(S): 100 INJECTION SUBCUTANEOUS at 06:02

## 2023-08-14 RX ADMIN — Medication 600 MILLIGRAM(S): at 05:41

## 2023-08-14 RX ADMIN — SIMETHICONE 80 MILLIGRAM(S): 80 TABLET, CHEWABLE ORAL at 09:25

## 2023-08-14 RX ADMIN — SIMETHICONE 80 MILLIGRAM(S): 80 TABLET, CHEWABLE ORAL at 05:41

## 2023-08-14 RX ADMIN — Medication 975 MILLIGRAM(S): at 08:03

## 2023-08-14 RX ADMIN — Medication 975 MILLIGRAM(S): at 08:43

## 2023-08-14 RX ADMIN — Medication 600 MILLIGRAM(S): at 06:24

## 2023-08-14 NOTE — PROGRESS NOTE ADULT - ATTENDING COMMENTS
agree with above  32 yo now P1, s/p primary  section for arrest of descent, POD1, recovering well  She is OOB to chair, tolerating liquids, passing flatus, morel still in place, bottle/pumping  Denies fever, chills, CP, SOB, N/V, LE pain  Minimal bleeding, pain controlled with meds    Vital Signs Last 24 Hrs  T(C): 37.6 (12 Aug 2023 08:40), Max: 37.6 (12 Aug 2023 03:00)  T(F): 99.6 (12 Aug 2023 08:40), Max: 99.6 (12 Aug 2023 03:00)  HR: 86 (12 Aug 2023 08:40) (49 - 114)  BP: 100/55 (12 Aug 2023 08:40) (87/50 - 165/129)  BP(mean): --  RR: 18 (12 Aug 2023 08:40) (16 - 20)  SpO2: 95% (12 Aug 2023 02:39) (84% - 100%)    Parameters below as of 12 Aug 2023 08:40  Patient On (Oxygen Delivery Method): room air  O2 Flow (L/min): 97    abdomen soft, nontender, firm uterine fundus  well healing pfannenstiel skin incision, steris in place, c/d/i  normal lochia    routine postpartum care  remove morel later in morning/early afternoon  regular diet, PO hydration  monitor vitals and bleeding  encourage ambulation and incentive spirometry  post op CBC  likely discharge tomorrow
agree with above  34 yo now P1, s/p primary  section for arrest of descent, POD3, recovering well  Ambulating, voiding, passing flatus and had BM, tolerating regular diet.   Having upper back and chest discomfort, s/p normal EKG  Denies fever, chills, CP, SOB, N/V, LE pain  Minimal bleeding, pain controlled with meds    Vital Signs Last 24 Hrs  Vital Signs Last 24 Hrs  T(C): 36.9 (14 Aug 2023 07:53), Max: 36.9 (13 Aug 2023 11:55)  T(F): 98.4 (14 Aug 2023 07:53), Max: 98.5 (13 Aug 2023 11:55)  HR: 85 (14 Aug 2023 07:53) (80 - 99)  BP: 111/56 (14 Aug 2023 07:53) (92/65 - 114/54)  BP(mean): --  RR: 18 (14 Aug 2023 07:53) (18 - 18)  SpO2: 98% (14 Aug 2023 07:53) (97% - 98%)    Parameters below as of 14 Aug 2023 07:53  Patient On (Oxygen Delivery Method): room air    abdomen soft, nontender, firm uterine fundus  well healing pfannenstiel skin incision, steris in place, c/d/i  normal lochia    routine postpartum care  pain management PRN  regular diet, PO hydration  ambulation, incentive spirometry  lovenox DVT ppx  monitor vitals and bleeding  discharge today, f/up 2wks

## 2023-08-14 NOTE — PROGRESS NOTE ADULT - ASSESSMENT
32 yo now P1, s/p primary  section for arrest of descent, POD3, recovering well    - routine postpartum care  - regular diet, PO hydration  - monitor vitals and bleeding  - pain management PRN, to discuss back pain management w/ attending  - lovenox DVT ppx  - encourage ambulation and incentive spirometry  - anticipate d/c today    Dr. Snider and Dr. Mei to be made aware.  34 yo now P1, s/p primary  section for arrest of descent, POD3, recovering well    - routine postpartum care  - regular diet, PO hydration  - monitor vitals and bleeding  - pain management PRN  - flexiril for back pain  - lovenox DVT ppx  - encourage ambulation and incentive spirometry  - anticipate d/c today    Dr. Snider and Dr. Mei to be made aware.

## 2023-08-14 NOTE — PROGRESS NOTE ADULT - SUBJECTIVE AND OBJECTIVE BOX
PGY1 Note:  Patient seen and examined at bedside, recovering well. Overnight had an episode of chest pain radiating to mid-back. EKG NSR. Denies chest pressure, SOB, palpitations. States pain is constant, dull, non-radiating. Not worse with movement. Tylenol/motrin have not alleviated pain. Denies fever, chills, chest pain, SOB, nausea, vomiting, LE pain. Pain well-controlled with medication. Minimal bleeding, voiding, ambulating, passed flatus, tolerating regular diet.     Ambulating: Yes  Voiding: Yes  Flatus: Yes  Bowel movements: Yes   Breast or bottle feeding: breast  Diet: Regular    Physical Exam  Vital Signs Last 24 Hrs  T(C): 36.6 (14 Aug 2023 03:00), Max: 36.9 (13 Aug 2023 11:55)  T(F): 97.8 (14 Aug 2023 03:00), Max: 98.5 (13 Aug 2023 11:55)  HR: 80 (14 Aug 2023 03:00) (80 - 99)  BP: 92/65 (14 Aug 2023 03:00) (92/65 - 124/56)  RR: 18 (14 Aug 2023 03:00) (18 - 18)  SpO2: 97% (13 Aug 2023 23:30) (97% - 98%)    Parameters below as of 13 Aug 2023 11:55  Patient On (Oxygen Delivery Method): room air      Gen: AAOx3, NAD  Heart: Normal S1S2, RRR, no m/r/g  Lungs: CTA b/l, no r/r/w   Fundus: firm, below umbilicus   Wound: steris in place c/d/i   Abd: Soft, nontender, nondistended, BS+  Lochia: minimal  Ext: No calf tenderness, no swelling    Labs:                        9.7    13.62 )-----------( 169      ( 12 Aug 2023 11:25 )             28.6                         12.1   16.97 )-----------( 187      ( 11 Aug 2023 19:47 )             33.1        acetaminophen     Tablet .. 975 milliGRAM(s) Oral <User Schedule>  diphenhydrAMINE 25 milliGRAM(s) Oral every 6 hours PRN  enoxaparin Injectable 40 milliGRAM(s) SubCutaneous every 24 hours  ibuprofen  Tablet. 600 milliGRAM(s) Oral every 6 hours  lactated ringers. 1000 milliLiter(s) IV Continuous <Continuous>  lanolin Ointment 1 Application(s) Topical every 6 hours PRN  magnesium hydroxide Suspension 30 milliLiter(s) Oral two times a day PRN  oxyCODONE    IR 5 milliGRAM(s) Oral every 3 hours PRN  oxyCODONE    IR 5 milliGRAM(s) Oral once PRN  oxytocin Infusion 333.333 milliUNIT(s)/Min IV Continuous <Continuous>  simethicone 80 milliGRAM(s) Chew every 4 hours  PGY1 Note:  Patient seen and examined at bedside, recovering well. Overnight had an episode of chest pain radiating to mid-back. EKG NSR. Denies chest pressure, SOB, palpitations. States pain is constant, dull, non-radiating. Not worse with movement. Tylenol/motrin have not alleviated pain. Denies fever, chills, chest pain, SOB, nausea, vomiting, LE pain. Pain well-controlled with medication. Minimal bleeding, voiding, ambulating, passed flatus, tolerating regular diet.     Ambulating: Yes  Voiding: Yes  Flatus: Yes  Bowel movements: Yes   Breast or bottle feeding: bottle  Diet: Regular    Physical Exam  Vital Signs Last 24 Hrs  T(C): 36.6 (14 Aug 2023 03:00), Max: 36.9 (13 Aug 2023 11:55)  T(F): 97.8 (14 Aug 2023 03:00), Max: 98.5 (13 Aug 2023 11:55)  HR: 80 (14 Aug 2023 03:00) (80 - 99)  BP: 92/65 (14 Aug 2023 03:00) (92/65 - 124/56)  RR: 18 (14 Aug 2023 03:00) (18 - 18)  SpO2: 97% (13 Aug 2023 23:30) (97% - 98%)    Parameters below as of 13 Aug 2023 11:55  Patient On (Oxygen Delivery Method): room air      Gen: AAOx3, NAD  Heart: Normal S1S2, RRR, no m/r/g  Lungs: CTA b/l, no r/r/w   Fundus: firm, below umbilicus   Wound: steris in place c/d/i   Abd: Soft, nontender, nondistended, BS+  Lochia: minimal  Ext: No calf tenderness, no swelling    Labs:                        9.7    13.62 )-----------( 169      ( 12 Aug 2023 11:25 )             28.6                         12.1   16.97 )-----------( 187      ( 11 Aug 2023 19:47 )             33.1        acetaminophen     Tablet .. 975 milliGRAM(s) Oral <User Schedule>  diphenhydrAMINE 25 milliGRAM(s) Oral every 6 hours PRN  enoxaparin Injectable 40 milliGRAM(s) SubCutaneous every 24 hours  ibuprofen  Tablet. 600 milliGRAM(s) Oral every 6 hours  lactated ringers. 1000 milliLiter(s) IV Continuous <Continuous>  lanolin Ointment 1 Application(s) Topical every 6 hours PRN  magnesium hydroxide Suspension 30 milliLiter(s) Oral two times a day PRN  oxyCODONE    IR 5 milliGRAM(s) Oral every 3 hours PRN  oxyCODONE    IR 5 milliGRAM(s) Oral once PRN  oxytocin Infusion 333.333 milliUNIT(s)/Min IV Continuous <Continuous>  simethicone 80 milliGRAM(s) Chew every 4 hours  PGY1 Note:  Patient seen and examined at bedside, recovering well. Overnight had an episode of chest pain radiating to mid-back. EKG NSR. Denies chest pressure, SOB, palpitations. States pain is constant, dull, non-radiating. Not worse with movement. Tylenol/motrin have not alleviated pain. Denies fever, chills, chest pain, SOB, nausea, vomiting, LE pain. Pain well-controlled with medication. Minimal bleeding, voiding, ambulating, passed flatus, tolerating regular diet.     Ambulating: Yes  Voiding: Yes  Flatus: Yes  Bowel movements: Yes   Breast or bottle feeding: bottle  Diet: Regular    Physical Exam  Vital Signs Last 24 Hrs  T(C): 36.6 (14 Aug 2023 03:00), Max: 36.9 (13 Aug 2023 11:55)  T(F): 97.8 (14 Aug 2023 03:00), Max: 98.5 (13 Aug 2023 11:55)  HR: 80 (14 Aug 2023 03:00) (80 - 99)  BP: 92/65 (14 Aug 2023 03:00) (92/65 - 124/56)  RR: 18 (14 Aug 2023 03:00) (18 - 18)  SpO2: 97% (13 Aug 2023 23:30) (97% - 98%)    Parameters below as of 13 Aug 2023 11:55  Patient On (Oxygen Delivery Method): room air      Gen: AAOx3, NAD  Heart: Normal S1S2, RRR, no m/r/g  Lungs: CTA b/l, no r/r/w   Fundus: firm, below umbilicus   Wound: steris in place c/d/i   Abd: Soft, nontender, nondistended, BS+  Lochia: minimal  Ext: No calf tenderness, minimal LE swelling    Labs:                        9.7    13.62 )-----------( 169      ( 12 Aug 2023 11:25 )             28.6                         12.1   16.97 )-----------( 187      ( 11 Aug 2023 19:47 )             33.1        acetaminophen     Tablet .. 975 milliGRAM(s) Oral <User Schedule>  diphenhydrAMINE 25 milliGRAM(s) Oral every 6 hours PRN  enoxaparin Injectable 40 milliGRAM(s) SubCutaneous every 24 hours  ibuprofen  Tablet. 600 milliGRAM(s) Oral every 6 hours  lactated ringers. 1000 milliLiter(s) IV Continuous <Continuous>  lanolin Ointment 1 Application(s) Topical every 6 hours PRN  magnesium hydroxide Suspension 30 milliLiter(s) Oral two times a day PRN  oxyCODONE    IR 5 milliGRAM(s) Oral every 3 hours PRN  oxyCODONE    IR 5 milliGRAM(s) Oral once PRN  oxytocin Infusion 333.333 milliUNIT(s)/Min IV Continuous <Continuous>  simethicone 80 milliGRAM(s) Chew every 4 hours

## 2023-08-15 PROBLEM — D24.9 BENIGN NEOPLASM OF UNSPECIFIED BREAST: Chronic | Status: ACTIVE | Noted: 2023-08-09

## 2023-08-23 ENCOUNTER — APPOINTMENT (OUTPATIENT)
Dept: OBGYN | Facility: CLINIC | Age: 33
End: 2023-08-23
Payer: COMMERCIAL

## 2023-08-23 VITALS — HEIGHT: 69 IN | TEMPERATURE: 98.2 F | BODY MASS INDEX: 31.55 KG/M2 | WEIGHT: 213 LBS

## 2023-08-23 DIAGNOSIS — K21.9 GASTRO-ESOPHAGEAL REFLUX DISEASE WITHOUT ESOPHAGITIS: ICD-10-CM

## 2023-08-23 DIAGNOSIS — O32.4XX0 MATERNAL CARE FOR HIGH HEAD AT TERM, NOT APPLICABLE OR UNSPECIFIED: ICD-10-CM

## 2023-08-23 DIAGNOSIS — Z3A.39 39 WEEKS GESTATION OF PREGNANCY: ICD-10-CM

## 2023-08-23 DIAGNOSIS — O23.40 UNSPECIFIED INFECTION OF URINARY TRACT IN PREGNANCY, UNSPECIFIED TRIMESTER: ICD-10-CM

## 2023-08-23 DIAGNOSIS — E86.0 DEHYDRATION: ICD-10-CM

## 2023-08-23 DIAGNOSIS — Z23 ENCOUNTER FOR IMMUNIZATION: ICD-10-CM

## 2023-08-23 DIAGNOSIS — O26.893 OTHER SPECIFIED PREGNANCY RELATED CONDITIONS, THIRD TRIMESTER: ICD-10-CM

## 2023-08-23 DIAGNOSIS — O61.0 FAILED MEDICAL INDUCTION OF LABOR: ICD-10-CM

## 2023-08-23 DIAGNOSIS — R07.9 CHEST PAIN, UNSPECIFIED: ICD-10-CM

## 2023-08-23 DIAGNOSIS — R51.9 HEADACHE, UNSPECIFIED: ICD-10-CM

## 2023-08-23 LAB
BILIRUB UR QL STRIP: NORMAL
GLUCOSE UR-MCNC: NORMAL
HCG UR QL: 0.2 EU/DL
HGB UR QL STRIP.AUTO: NORMAL
KETONES UR-MCNC: NORMAL
LEUKOCYTE ESTERASE UR QL STRIP: NORMAL
NITRITE UR QL STRIP: NORMAL
PH UR STRIP: 6
PROT UR STRIP-MCNC: NORMAL
SP GR UR STRIP: 1.01

## 2023-08-23 PROCEDURE — 81003 URINALYSIS AUTO W/O SCOPE: CPT | Mod: QW

## 2023-08-23 PROCEDURE — 0503F POSTPARTUM CARE VISIT: CPT

## 2023-08-23 NOTE — HISTORY OF PRESENT ILLNESS
[Delivery Date: ___] : on [unfilled] [Primary C/S] : delivered by  section [Female] : Delivery History: baby girl [Wt. ___] : weighing [unfilled] [Breastfeeding] : not currently nursing [Abdominal Pain] : no abdominal pain [Back Pain] : no back pain [Breast Pain] : no breast pain [BreastFeeding Problems] : no breastfeeding problems [Chest Pain] : no chest pain [Cracked Nipples] : no cracked nipples [S/Sx PP Depression] : no signs/symptoms of postpartum depression [Heavy Bleeding] : no heavy bleeding [Incisional Drainage] : no incisional drainage [Incisional Pain] : no incisional pain [Irregular Bleeding] : no irregular bleeding [Leg Pain] : no leg pain [Shortness of Breath] : no shortness of breath [Suicidal Ideation] : no suicidal ideation [Vaginal Discharge] : no vaginal discharge [Chills] : no chills [Fatigue] : no fatigue [Dysuria] : no dysuria [Fever] : no fever [Headache] : no headache [Nausea] : no nausea [Vomiting] : no vomiting [Clean/Dry/Intact] : clean, dry and intact [Erythema] : not erythematous [Swelling] : not swollen [Dehiscence] : not dehisced [de-identified] : 32 yo  s/p  delivery for arrest of descent pushed for 4 hours [de-identified] : Good mood, good support [de-identified] : Steri strips removed [de-identified] : 32 yo  s/p primary  delivery 23, good support, good mood, doing well. [de-identified] : - Incision well appearing, discussed wound care. Discussed signs and symptoms of postpartum depression/blues. Discussed contraception, she used OCPs in the past and did not like them. She is unsure if she wants to use anything, can discuss again at future visits. Return 1 month for postpartum visit.

## 2023-09-20 ENCOUNTER — APPOINTMENT (OUTPATIENT)
Dept: OBGYN | Facility: CLINIC | Age: 33
End: 2023-09-20
Payer: COMMERCIAL

## 2023-09-20 VITALS — BODY MASS INDEX: 31.98 KG/M2 | HEIGHT: 68 IN | WEIGHT: 211 LBS

## 2023-09-20 PROCEDURE — 0503F POSTPARTUM CARE VISIT: CPT

## 2023-10-23 ENCOUNTER — OUTPATIENT (OUTPATIENT)
Dept: OUTPATIENT SERVICES | Facility: HOSPITAL | Age: 33
LOS: 1 days | Discharge: HOME | End: 2023-10-23
Payer: COMMERCIAL

## 2023-10-23 ENCOUNTER — APPOINTMENT (OUTPATIENT)
Dept: PULMONOLOGY | Facility: CLINIC | Age: 33
End: 2023-10-23
Payer: COMMERCIAL

## 2023-10-23 ENCOUNTER — APPOINTMENT (OUTPATIENT)
Dept: SLEEP CENTER | Facility: HOSPITAL | Age: 33
End: 2023-10-23
Payer: COMMERCIAL

## 2023-10-23 VITALS
BODY MASS INDEX: 32.28 KG/M2 | OXYGEN SATURATION: 97 % | WEIGHT: 213 LBS | DIASTOLIC BLOOD PRESSURE: 78 MMHG | RESPIRATION RATE: 14 BRPM | SYSTOLIC BLOOD PRESSURE: 112 MMHG | HEIGHT: 68 IN | HEART RATE: 77 BPM

## 2023-10-23 DIAGNOSIS — Z80.3 FAMILY HISTORY OF MALIGNANT NEOPLASM OF BREAST: ICD-10-CM

## 2023-10-23 DIAGNOSIS — E66.9 OBESITY, UNSPECIFIED: ICD-10-CM

## 2023-10-23 DIAGNOSIS — Z86.018 PERSONAL HISTORY OF OTHER BENIGN NEOPLASM: ICD-10-CM

## 2023-10-23 DIAGNOSIS — F17.210 NICOTINE DEPENDENCE, CIGARETTES, UNCOMPLICATED: ICD-10-CM

## 2023-10-23 DIAGNOSIS — G47.33 OBSTRUCTIVE SLEEP APNEA (ADULT) (PEDIATRIC): ICD-10-CM

## 2023-10-23 PROCEDURE — 95810 POLYSOM 6/> YRS 4/> PARAM: CPT

## 2023-10-23 PROCEDURE — 95810 POLYSOM 6/> YRS 4/> PARAM: CPT | Mod: 26

## 2023-10-23 PROCEDURE — 99203 OFFICE O/P NEW LOW 30 MIN: CPT

## 2023-10-25 DIAGNOSIS — G47.33 OBSTRUCTIVE SLEEP APNEA (ADULT) (PEDIATRIC): ICD-10-CM

## 2023-12-05 ENCOUNTER — APPOINTMENT (OUTPATIENT)
Dept: PULMONOLOGY | Facility: CLINIC | Age: 33
End: 2023-12-05
Payer: COMMERCIAL

## 2023-12-05 VITALS
BODY MASS INDEX: 32.74 KG/M2 | SYSTOLIC BLOOD PRESSURE: 118 MMHG | RESPIRATION RATE: 14 BRPM | HEIGHT: 68 IN | WEIGHT: 216 LBS | DIASTOLIC BLOOD PRESSURE: 72 MMHG | HEART RATE: 95 BPM | OXYGEN SATURATION: 98 %

## 2023-12-05 DIAGNOSIS — G47.33 OBSTRUCTIVE SLEEP APNEA (ADULT) (PEDIATRIC): ICD-10-CM

## 2023-12-05 PROCEDURE — 99213 OFFICE O/P EST LOW 20 MIN: CPT

## 2023-12-11 ENCOUNTER — OUTPATIENT (OUTPATIENT)
Dept: OUTPATIENT SERVICES | Facility: HOSPITAL | Age: 33
LOS: 1 days | Discharge: HOME | End: 2023-12-11
Payer: COMMERCIAL

## 2023-12-11 ENCOUNTER — APPOINTMENT (OUTPATIENT)
Dept: SLEEP CENTER | Facility: HOSPITAL | Age: 33
End: 2023-12-11
Payer: COMMERCIAL

## 2023-12-11 DIAGNOSIS — G47.33 OBSTRUCTIVE SLEEP APNEA (ADULT) (PEDIATRIC): ICD-10-CM

## 2023-12-11 PROCEDURE — 95800 SLP STDY UNATTENDED: CPT | Mod: 26

## 2023-12-11 PROCEDURE — 95800 SLP STDY UNATTENDED: CPT

## 2023-12-14 DIAGNOSIS — G47.33 OBSTRUCTIVE SLEEP APNEA (ADULT) (PEDIATRIC): ICD-10-CM

## 2023-12-18 ENCOUNTER — NON-APPOINTMENT (OUTPATIENT)
Age: 33
End: 2023-12-18

## 2024-02-12 ENCOUNTER — APPOINTMENT (OUTPATIENT)
Dept: PULMONOLOGY | Facility: CLINIC | Age: 34
End: 2024-02-12
Payer: COMMERCIAL

## 2024-02-12 VITALS
RESPIRATION RATE: 14 BRPM | HEIGHT: 68 IN | HEART RATE: 100 BPM | DIASTOLIC BLOOD PRESSURE: 62 MMHG | BODY MASS INDEX: 33.34 KG/M2 | SYSTOLIC BLOOD PRESSURE: 102 MMHG | OXYGEN SATURATION: 98 % | WEIGHT: 220 LBS

## 2024-02-12 DIAGNOSIS — R40.0 SOMNOLENCE: ICD-10-CM

## 2024-02-12 PROCEDURE — 99213 OFFICE O/P EST LOW 20 MIN: CPT

## 2024-02-12 PROCEDURE — G2211 COMPLEX E/M VISIT ADD ON: CPT

## 2024-02-12 RX ORDER — NITROFURANTOIN (MONOHYDRATE/MACROCRYSTALS) 25; 75 MG/1; MG/1
100 CAPSULE ORAL
Qty: 14 | Refills: 0 | Status: COMPLETED | COMMUNITY
Start: 2023-07-22 | End: 2024-02-12

## 2024-02-12 RX ORDER — FAMOTIDINE 20 MG/1
20 TABLET, FILM COATED ORAL
Qty: 30 | Refills: 3 | Status: COMPLETED | COMMUNITY
Start: 2023-02-08 | End: 2024-02-12

## 2024-02-12 NOTE — PLAN
[TextEntry] : Patient was counseled for sleep hygiene told her that she need to provide himself with at least 6-hour of sleep Counseled for weight reduction Blood for TSH ordered Will follow-up in 4 months if she still complaining of daytime somnolence after adequate sleep hygiene and TSH normal done will proceed with sleep study followed by [facility]

## 2024-02-12 NOTE — HISTORY OF PRESENT ILLNESS
[TextBox_4] : Patient present today for follow-up she status post home sleep study which always was negative same as the one done in the sleep lab patient has a baby and been having interrupted sleep Most likely not getting adequate time of sleep she said now her baby is sleeping better and she has been sleeping more time and she feels more energetic during the day

## 2024-06-17 ENCOUNTER — APPOINTMENT (OUTPATIENT)
Dept: PULMONOLOGY | Facility: CLINIC | Age: 34
End: 2024-06-17

## 2024-06-28 ENCOUNTER — NON-APPOINTMENT (OUTPATIENT)
Age: 34
End: 2024-06-28

## 2024-09-26 ENCOUNTER — APPOINTMENT (OUTPATIENT)
Dept: OBGYN | Facility: CLINIC | Age: 34
End: 2024-09-26

## 2024-10-15 ENCOUNTER — APPOINTMENT (OUTPATIENT)
Dept: OBGYN | Facility: CLINIC | Age: 34
End: 2024-10-15
Payer: COMMERCIAL

## 2024-10-15 DIAGNOSIS — Z01.419 ENCOUNTER FOR GYNECOLOGICAL EXAMINATION (GENERAL) (ROUTINE) W/OUT ABNORMAL FINDINGS: ICD-10-CM

## 2024-10-15 DIAGNOSIS — D24.9 BENIGN NEOPLASM OF UNSPECIFIED BREAST: ICD-10-CM

## 2024-10-15 PROCEDURE — 99395 PREV VISIT EST AGE 18-39: CPT

## 2024-10-22 LAB
CYTOLOGY CVX/VAG DOC THIN PREP: NORMAL
HPV HIGH+LOW RISK DNA PNL CVX: NOT DETECTED

## 2024-11-05 NOTE — OB RN TRIAGE NOTE - BP NONINVASIVE SYSTOLIC (MM HG)
140

## 2025-04-28 NOTE — OB PROVIDER H&P - NSHPPHYSICALEXAM_GEN_ALL_CORE
Vital Signs Last 24 Hrs  HR: 88 (10 Aug 2023 01:23) (72 - 99)  BP: 125/80 (10 Aug 2023 01:23) (99/66 - 128/79)  RR: 17 (09 Aug 2023 21:02) (17 - 17)  SpO2: 99% (10 Aug 2023 01:15) (99% - 99%)      Gen: NAD, AOx3  Abdomen: soft, gravid, nontender, no palpable contractions  Cardio: RRR  Pulm:CTABL  DTR 2+    EFM: 125bpm/ moderate variability/ +accels  [place]: irregular  SVE: 1/0/-3  BSS: vertex